# Patient Record
Sex: MALE | Race: WHITE | Employment: OTHER | URBAN - NONMETROPOLITAN AREA
[De-identification: names, ages, dates, MRNs, and addresses within clinical notes are randomized per-mention and may not be internally consistent; named-entity substitution may affect disease eponyms.]

---

## 2017-06-11 RX ORDER — CARBAMAZEPINE 100 MG/1
TABLET, CHEWABLE ORAL
Qty: 300 TABLET | Refills: 0 | Status: SHIPPED | OUTPATIENT
Start: 2017-06-11 | End: 2017-07-06 | Stop reason: SDUPTHER

## 2017-07-06 RX ORDER — CARBAMAZEPINE 100 MG/1
TABLET, CHEWABLE ORAL
Qty: 300 TABLET | Refills: 0 | Status: SHIPPED | OUTPATIENT
Start: 2017-07-06 | End: 2017-08-05 | Stop reason: SDUPTHER

## 2017-07-06 RX ORDER — MELOXICAM 7.5 MG/1
TABLET ORAL
Qty: 60 TABLET | Refills: 0 | Status: SHIPPED | OUTPATIENT
Start: 2017-07-06 | End: 2017-08-05 | Stop reason: SDUPTHER

## 2017-08-07 RX ORDER — RANITIDINE 150 MG/1
TABLET ORAL
Qty: 90 TABLET | Refills: 0 | Status: SHIPPED | OUTPATIENT
Start: 2017-08-07 | End: 2017-09-27 | Stop reason: SDUPTHER

## 2017-08-07 RX ORDER — CARBAMAZEPINE 100 MG/1
TABLET, CHEWABLE ORAL
Qty: 300 TABLET | Refills: 0 | Status: SHIPPED | OUTPATIENT
Start: 2017-08-07 | End: 2017-09-01 | Stop reason: SDUPTHER

## 2017-08-07 RX ORDER — BACLOFEN 20 MG/1
TABLET ORAL
Qty: 90 TABLET | Refills: 0 | Status: SHIPPED | OUTPATIENT
Start: 2017-08-07 | End: 2017-09-01 | Stop reason: SDUPTHER

## 2017-08-07 RX ORDER — MELOXICAM 7.5 MG/1
TABLET ORAL
Qty: 60 TABLET | Refills: 0 | Status: SHIPPED | OUTPATIENT
Start: 2017-08-07 | End: 2017-09-27 | Stop reason: SDUPTHER

## 2017-09-01 RX ORDER — BACLOFEN 20 MG/1
TABLET ORAL
Qty: 90 TABLET | Refills: 0 | Status: SHIPPED | OUTPATIENT
Start: 2017-09-01 | End: 2017-09-27 | Stop reason: SDUPTHER

## 2017-09-01 RX ORDER — NORTRIPTYLINE HYDROCHLORIDE 25 MG/1
CAPSULE ORAL
Qty: 30 CAPSULE | Refills: 0 | Status: SHIPPED | OUTPATIENT
Start: 2017-09-01 | End: 2017-09-27 | Stop reason: SDUPTHER

## 2017-09-01 RX ORDER — CARBAMAZEPINE 100 MG/1
TABLET, CHEWABLE ORAL
Qty: 300 TABLET | Refills: 0 | Status: SHIPPED | OUTPATIENT
Start: 2017-09-01 | End: 2017-09-27 | Stop reason: SDUPTHER

## 2017-09-19 DIAGNOSIS — E78.00 HYPERCHOLESTEREMIA: ICD-10-CM

## 2017-09-20 DIAGNOSIS — E78.00 HYPERCHOLESTEREMIA: ICD-10-CM

## 2017-09-20 LAB
ALBUMIN SERPL-MCNC: 4.2 G/DL (ref 3.5–5.2)
ALP BLD-CCNC: 48 U/L (ref 40–130)
ALT SERPL-CCNC: 21 U/L (ref 5–41)
ANION GAP SERPL CALCULATED.3IONS-SCNC: 15 MMOL/L (ref 7–19)
AST SERPL-CCNC: 20 U/L (ref 5–40)
BILIRUB SERPL-MCNC: 0.3 MG/DL (ref 0.2–1.2)
BUN BLDV-MCNC: 24 MG/DL (ref 8–23)
CALCIUM SERPL-MCNC: 9.4 MG/DL (ref 8.8–10.2)
CHLORIDE BLD-SCNC: 98 MMOL/L (ref 98–111)
CO2: 26 MMOL/L (ref 22–29)
CREAT SERPL-MCNC: 0.6 MG/DL (ref 0.5–1.2)
GFR NON-AFRICAN AMERICAN: >60
GLUCOSE BLD-MCNC: 87 MG/DL (ref 74–109)
HCT VFR BLD CALC: 40.8 % (ref 42–52)
HEMOGLOBIN: 13.8 G/DL (ref 14–18)
LDL CHOLESTEROL DIRECT: 115 MG/DL
MCH RBC QN AUTO: 33.1 PG (ref 27–31)
MCHC RBC AUTO-ENTMCNC: 33.8 G/DL (ref 33–37)
MCV RBC AUTO: 97.8 FL (ref 80–94)
PDW BLD-RTO: 11.8 % (ref 11.5–14.5)
PLATELET # BLD: 264 K/UL (ref 130–400)
PMV BLD AUTO: 9.9 FL (ref 9.4–12.4)
POTASSIUM SERPL-SCNC: 4.1 MMOL/L (ref 3.5–5)
RBC # BLD: 4.17 M/UL (ref 4.7–6.1)
SODIUM BLD-SCNC: 139 MMOL/L (ref 136–145)
TOTAL PROTEIN: 7.3 G/DL (ref 6.6–8.7)
WBC # BLD: 3.8 K/UL (ref 4.8–10.8)

## 2017-09-21 RX ORDER — TAMSULOSIN HYDROCHLORIDE 0.4 MG/1
0.4 CAPSULE ORAL DAILY
COMMUNITY
End: 2018-06-26 | Stop reason: SDUPTHER

## 2017-09-21 RX ORDER — ACETAMINOPHEN, ASPIRIN AND CAFFEINE 250; 250; 65 MG/1; MG/1; MG/1
2 TABLET, FILM COATED ORAL EVERY 6 HOURS PRN
COMMUNITY
End: 2017-11-17

## 2017-09-21 RX ORDER — FINASTERIDE 5 MG/1
5 TABLET, FILM COATED ORAL DAILY
COMMUNITY

## 2017-09-21 RX ORDER — ATORVASTATIN CALCIUM 40 MG/1
40 TABLET, FILM COATED ORAL DAILY
COMMUNITY
End: 2017-09-27 | Stop reason: SDUPTHER

## 2017-09-27 ENCOUNTER — TELEPHONE (OUTPATIENT)
Dept: NEUROSURGERY | Age: 67
End: 2017-09-27

## 2017-09-27 ENCOUNTER — OFFICE VISIT (OUTPATIENT)
Dept: INTERNAL MEDICINE | Age: 67
End: 2017-09-27
Payer: MEDICARE

## 2017-09-27 VITALS
WEIGHT: 159 LBS | SYSTOLIC BLOOD PRESSURE: 124 MMHG | DIASTOLIC BLOOD PRESSURE: 70 MMHG | OXYGEN SATURATION: 97 % | HEART RATE: 68 BPM | HEIGHT: 71 IN | BODY MASS INDEX: 22.26 KG/M2

## 2017-09-27 DIAGNOSIS — R41.3 MEMORY DEFICIT: Primary | ICD-10-CM

## 2017-09-27 DIAGNOSIS — I61.9 NONTRAUMATIC INTRACEREBRAL HEMORRHAGE, UNSPECIFIED CEREBRAL LOCATION, UNSPECIFIED LATERALITY (HCC): ICD-10-CM

## 2017-09-27 DIAGNOSIS — E78.00 PURE HYPERCHOLESTEROLEMIA: ICD-10-CM

## 2017-09-27 DIAGNOSIS — Z23 NEED FOR PROPHYLACTIC VACCINATION AGAINST STREPTOCOCCUS PNEUMONIAE (PNEUMOCOCCUS): ICD-10-CM

## 2017-09-27 DIAGNOSIS — N40.1 BENIGN NON-NODULAR PROSTATIC HYPERPLASIA WITH LOWER URINARY TRACT SYMPTOMS: ICD-10-CM

## 2017-09-27 DIAGNOSIS — Z91.81 RISK FOR FALLS: ICD-10-CM

## 2017-09-27 PROCEDURE — 90670 PCV13 VACCINE IM: CPT | Performed by: INTERNAL MEDICINE

## 2017-09-27 PROCEDURE — 1036F TOBACCO NON-USER: CPT | Performed by: INTERNAL MEDICINE

## 2017-09-27 PROCEDURE — 99214 OFFICE O/P EST MOD 30 MIN: CPT | Performed by: INTERNAL MEDICINE

## 2017-09-27 PROCEDURE — 3017F COLORECTAL CA SCREEN DOC REV: CPT | Performed by: INTERNAL MEDICINE

## 2017-09-27 PROCEDURE — 1123F ACP DISCUSS/DSCN MKR DOCD: CPT | Performed by: INTERNAL MEDICINE

## 2017-09-27 PROCEDURE — G0009 ADMIN PNEUMOCOCCAL VACCINE: HCPCS | Performed by: INTERNAL MEDICINE

## 2017-09-27 PROCEDURE — 4040F PNEUMOC VAC/ADMIN/RCVD: CPT | Performed by: INTERNAL MEDICINE

## 2017-09-27 PROCEDURE — G8420 CALC BMI NORM PARAMETERS: HCPCS | Performed by: INTERNAL MEDICINE

## 2017-09-27 PROCEDURE — G8427 DOCREV CUR MEDS BY ELIG CLIN: HCPCS | Performed by: INTERNAL MEDICINE

## 2017-09-27 RX ORDER — NORTRIPTYLINE HYDROCHLORIDE 25 MG/1
CAPSULE ORAL
Qty: 90 CAPSULE | Refills: 3 | Status: SHIPPED | OUTPATIENT
Start: 2017-09-27 | End: 2018-07-06 | Stop reason: SDUPTHER

## 2017-09-27 RX ORDER — MELOXICAM 7.5 MG/1
TABLET ORAL
Qty: 180 TABLET | Refills: 3 | Status: SHIPPED | OUTPATIENT
Start: 2017-09-27 | End: 2018-07-06 | Stop reason: SDUPTHER

## 2017-09-27 RX ORDER — CARBAMAZEPINE 100 MG/1
TABLET, CHEWABLE ORAL
Qty: 300 TABLET | Refills: 0 | Status: SHIPPED | OUTPATIENT
Start: 2017-09-27 | End: 2017-12-29 | Stop reason: SDUPTHER

## 2017-09-27 RX ORDER — BACLOFEN 20 MG/1
10 TABLET ORAL 3 TIMES DAILY PRN
Qty: 90 TABLET | Refills: 3 | Status: SHIPPED | OUTPATIENT
Start: 2017-09-27 | End: 2018-01-25 | Stop reason: SDUPTHER

## 2017-09-27 RX ORDER — RANITIDINE 150 MG/1
TABLET ORAL
Qty: 90 TABLET | Refills: 3 | Status: SHIPPED | OUTPATIENT
Start: 2017-09-27 | End: 2018-09-18 | Stop reason: SDUPTHER

## 2017-09-27 RX ORDER — ATORVASTATIN CALCIUM 80 MG/1
80 TABLET, FILM COATED ORAL DAILY
Qty: 30 TABLET | Refills: 5 | Status: SHIPPED | OUTPATIENT
Start: 2017-09-27 | End: 2018-07-06 | Stop reason: SDUPTHER

## 2017-09-27 ASSESSMENT — ENCOUNTER SYMPTOMS
ABDOMINAL PAIN: 0
COUGH: 0
SHORTNESS OF BREATH: 0
RHINORRHEA: 0
TROUBLE SWALLOWING: 0
SORE THROAT: 0
NAUSEA: 0

## 2017-09-27 ASSESSMENT — PATIENT HEALTH QUESTIONNAIRE - PHQ9
1. LITTLE INTEREST OR PLEASURE IN DOING THINGS: 0
SUM OF ALL RESPONSES TO PHQ9 QUESTIONS 1 & 2: 0
2. FEELING DOWN, DEPRESSED OR HOPELESS: 0
SUM OF ALL RESPONSES TO PHQ QUESTIONS 1-9: 0

## 2017-11-17 ENCOUNTER — OFFICE VISIT (OUTPATIENT)
Dept: NEUROLOGY | Age: 67
End: 2017-11-17
Payer: MEDICARE

## 2017-11-17 VITALS
DIASTOLIC BLOOD PRESSURE: 58 MMHG | SYSTOLIC BLOOD PRESSURE: 110 MMHG | WEIGHT: 159 LBS | HEIGHT: 71 IN | BODY MASS INDEX: 22.26 KG/M2

## 2017-11-17 DIAGNOSIS — G62.9 NEUROPATHY: ICD-10-CM

## 2017-11-17 DIAGNOSIS — M79.605 PAIN IN BOTH LOWER EXTREMITIES: Primary | ICD-10-CM

## 2017-11-17 DIAGNOSIS — Z87.828 HISTORY OF SPINAL CORD INJURY: ICD-10-CM

## 2017-11-17 DIAGNOSIS — R73.9 HYPERGLYCEMIA: ICD-10-CM

## 2017-11-17 DIAGNOSIS — R41.3 MEMORY LOSS: ICD-10-CM

## 2017-11-17 DIAGNOSIS — Z86.79 HISTORY OF SPONTANEOUS INTRAPARENCHYMAL INTRACRANIAL HEMORRHAGE DUE TO CEREBRAL AVM: ICD-10-CM

## 2017-11-17 DIAGNOSIS — M79.604 PAIN IN BOTH LOWER EXTREMITIES: Primary | ICD-10-CM

## 2017-11-17 LAB
C-REACTIVE PROTEIN: <0.03 MG/DL (ref 0–0.5)
HBA1C MFR BLD: 5.1 %
SEDIMENTATION RATE, ERYTHROCYTE: 5 MM/HR (ref 0–15)
VITAMIN B-12: 752 PG/ML (ref 211–946)

## 2017-11-17 PROCEDURE — 1123F ACP DISCUSS/DSCN MKR DOCD: CPT | Performed by: PSYCHIATRY & NEUROLOGY

## 2017-11-17 PROCEDURE — 3017F COLORECTAL CA SCREEN DOC REV: CPT | Performed by: PSYCHIATRY & NEUROLOGY

## 2017-11-17 PROCEDURE — 99204 OFFICE O/P NEW MOD 45 MIN: CPT | Performed by: PSYCHIATRY & NEUROLOGY

## 2017-11-17 PROCEDURE — 4040F PNEUMOC VAC/ADMIN/RCVD: CPT | Performed by: PSYCHIATRY & NEUROLOGY

## 2017-11-17 PROCEDURE — G8427 DOCREV CUR MEDS BY ELIG CLIN: HCPCS | Performed by: PSYCHIATRY & NEUROLOGY

## 2017-11-17 PROCEDURE — G8420 CALC BMI NORM PARAMETERS: HCPCS | Performed by: PSYCHIATRY & NEUROLOGY

## 2017-11-17 PROCEDURE — 1036F TOBACCO NON-USER: CPT | Performed by: PSYCHIATRY & NEUROLOGY

## 2017-11-17 PROCEDURE — G8484 FLU IMMUNIZE NO ADMIN: HCPCS | Performed by: PSYCHIATRY & NEUROLOGY

## 2017-11-17 NOTE — PROGRESS NOTES
MG capsule TAKE 1 CAPSULE BY MOUTH AT BEDTIME. 90 capsule 3    ranitidine (ZANTAC) 150 MG tablet TAKE 1 TABLET BY MOUTH AT BEDTIME 90 tablet 3    carBAMazepine (TEGRETOL) 100 MG chewable tablet TAKE 2 TABLETS BY MOUTH IN THE MORNING, 3 AT NOON, 2 AT SUPPER AND 3 AT BEDTIME. 300 tablet 0    meloxicam (MOBIC) 7.5 MG tablet TAKE 1 TABLET BY MOUTH TWICE DAILY AS NEEDED 180 tablet 3    finasteride (PROSCAR) 5 MG tablet Take 5 mg by mouth daily      tamsulosin (FLOMAX) 0.4 MG capsule Take 0.4 mg by mouth daily       No current facility-administered medications for this visit. Outpatient Prescriptions Marked as Taking for the 11/17/17 encounter (Office Visit) with Sameera Cota MD   Medication Sig Dispense Refill    Multiple Vitamins-Minerals (ALIVE MENS ENERGY PO) Take by mouth      Aspirin-Acetaminophen-Caffeine (EXCEDRIN EXTRA STRENGTH PO) Take by mouth      atorvastatin (LIPITOR) 80 MG tablet Take 1 tablet by mouth daily 30 tablet 5    baclofen (LIORESAL) 20 MG tablet Take 0.5 tablets by mouth 3 times daily as needed (muscle spasms) 90 tablet 3    nortriptyline (PAMELOR) 25 MG capsule TAKE 1 CAPSULE BY MOUTH AT BEDTIME. 90 capsule 3    ranitidine (ZANTAC) 150 MG tablet TAKE 1 TABLET BY MOUTH AT BEDTIME 90 tablet 3    carBAMazepine (TEGRETOL) 100 MG chewable tablet TAKE 2 TABLETS BY MOUTH IN THE MORNING, 3 AT NOON, 2 AT SUPPER AND 3 AT BEDTIME. 300 tablet 0    meloxicam (MOBIC) 7.5 MG tablet TAKE 1 TABLET BY MOUTH TWICE DAILY AS NEEDED 180 tablet 3    finasteride (PROSCAR) 5 MG tablet Take 5 mg by mouth daily      tamsulosin (FLOMAX) 0.4 MG capsule Take 0.4 mg by mouth daily         BP (!) 110/58   Ht 5' 11\" (1.803 m)   Wt 159 lb (72.1 kg)   BMI 22.18 kg/m²       Constitutional - well developed, well nourished.     Eyes - conjunctiva normal.  Pupils react to light  Ear, nose, throat -hearing intact to finger rub No scars, masses, or lesions over external nose or ears, no atrophy of tongue  Neck-symmetric, no masses noted, no jugular vein distension. No bruits noted. Respiration- chest wall appears symmetric, good expansion,   normal effort without use of accessory muscles  Cardiovascular- RRR  Musculoskeletal - no significant wasting of muscles noted, no bony deformities, gait no gross ataxia  Extremities-no clubbing, cyanosis or edema  Skin - warm, dry, and intact. No rash, erythema, or pallor. Psychiatric - mood, affect, and behavior appear normal.      Neurological exam  Awake, alert, fluent oriented x 3 appropriate affect  Attention and concentration appear appropriate  Remote memory is fair for family information. He cannot tell me the president before Obama. Short term memory 0/4 at 5 minutes. Speech normal without dysarthria  No clear issues with language of fund of knowledge    Cranial Nerve Exam   CN II- Visual fields grossly unremarkable. VA adequate. Discs sharp  CN III, IV,VI- PERRLA, EOMI, No nystagmus, conjugate eye movements, no ptosis  CN V-sensation intact to LT over face  CN VII-no facial asymmetry  CN VIII-Hearing intact   CN IX and X- Palate elevates in midline  CN XI-good shoulder shrug  CN XII-Tongue midline with no fasciculations or fibrillations    Motor Exam  Normal on the right. He has a left hemiparesis with spasticity. Sensory Exam  Decreased vibration above the knee on the right. Decreased vibration to the ankle on the left. Decreased pinprick to the bilateral knees    Reflexes   3+ in the upper extremities and knees. Trace at the ankles  No clonus ankles  No Alvarado's sign bilateral hands. No Babinski sign.     Tremors- no tremors in hands or head noted    Gait  Patient is in a wheelchair    Coordination  Finger to nose and MANUEL-unremarkable    Lab Results   Component Value Date    [id number] 752 11/17/2017     Lab Results   Component Value Date    WBC 3.8 (L) 09/20/2017    HGB 13.8 (L) 09/20/2017    HCT 40.8 (L) 09/20/2017    MCV 97.8 (H) 09/20/2017    PLT

## 2017-11-20 ENCOUNTER — TELEPHONE (OUTPATIENT)
Dept: NEUROSURGERY | Age: 67
End: 2017-11-20

## 2017-11-20 LAB
ALBUMIN SERPL-MCNC: 4.17 G/DL (ref 3.75–5.01)
ALPHA-1-GLOBULIN: 0.35 G/DL (ref 0.19–0.46)
ALPHA-2-GLOBULIN: 0.88 G/DL (ref 0.48–1.05)
BETA GLOBULIN: 0.68 G/DL (ref 0.48–1.1)
GAMMA GLOBULIN: 1.12 G/DL (ref 0.62–1.51)
IMMUNOFIXATION REFLEX: NORMAL
SPE/IFE INTERPRETATION: NORMAL
TOTAL PROTEIN: 7.2 G/DL (ref 6–8.3)

## 2017-11-30 RX ORDER — CARBAMAZEPINE 100 MG/1
TABLET, CHEWABLE ORAL
Qty: 300 TABLET | Refills: 0 | Status: SHIPPED | OUTPATIENT
Start: 2017-11-30 | End: 2017-12-28 | Stop reason: SDUPTHER

## 2017-12-05 ENCOUNTER — HOSPITAL ENCOUNTER (OUTPATIENT)
Dept: NEUROLOGY | Age: 67
Discharge: HOME | End: 2017-12-05
Payer: MEDICARE

## 2017-12-05 ENCOUNTER — HOSPITAL ENCOUNTER (OUTPATIENT)
Dept: MRI IMAGING | Age: 67
Discharge: HOME | End: 2017-12-05
Payer: MEDICARE

## 2017-12-05 DIAGNOSIS — G62.9 NEUROPATHY: ICD-10-CM

## 2017-12-05 DIAGNOSIS — Z87.828 HISTORY OF SPINAL CORD INJURY: ICD-10-CM

## 2017-12-05 LAB
GFR NON-AFRICAN AMERICAN: >60
PERFORMED ON: NORMAL
POC CREATININE: 0.7 MG/DL (ref 0.3–1.3)
POC SAMPLE TYPE: NORMAL

## 2017-12-05 PROCEDURE — 95886 MUSC TEST DONE W/N TEST COMP: CPT

## 2017-12-05 PROCEDURE — 95909 NRV CNDJ TST 5-6 STUDIES: CPT | Performed by: PSYCHIATRY & NEUROLOGY

## 2017-12-05 PROCEDURE — 72156 MRI NECK SPINE W/O & W/DYE: CPT

## 2017-12-05 PROCEDURE — 95909 NRV CNDJ TST 5-6 STUDIES: CPT

## 2017-12-05 PROCEDURE — 82565 ASSAY OF CREATININE: CPT

## 2017-12-05 PROCEDURE — A9577 INJ MULTIHANCE: HCPCS | Performed by: PSYCHIATRY & NEUROLOGY

## 2017-12-05 PROCEDURE — 95886 MUSC TEST DONE W/N TEST COMP: CPT | Performed by: PSYCHIATRY & NEUROLOGY

## 2017-12-05 PROCEDURE — 6360000004 HC RX CONTRAST MEDICATION: Performed by: PSYCHIATRY & NEUROLOGY

## 2017-12-05 RX ADMIN — GADOBENATE DIMEGLUMINE 15 ML: 529 INJECTION, SOLUTION INTRAVENOUS at 10:49

## 2017-12-05 NOTE — PROCEDURES
[email or web address] [facility]                   [address], [address] [facility]                               ELECTROMYOGRAM REPORT    PATIENT NAME: Melodie Andres                        :        1950  MED REC NO:   [id number]                              ROOM:  ACCOUNT NO:   [de-identified]                           ADMIT DATE: 2017  PROVIDER:     Larissa Hopper MD          DATE OF EM2017    EMG/NERVE CONDUCTION STUDY BILATERAL LOWER EXTREMITIES    SUMMARY:  Nerve conduction studies of the bilateral lower extremities show  absent sural responses. All four motor studies are low in amplitude with  prolonged latencies. Conduction velocities are all slowed and F waves are  prolonged. Needle exam of the bilateral lower extremities showed 2+ fibs and positive  waves in the right gastrocnemius with reduced recruitment. The patient was  poorly relaxed for testing of the iliopsoas and tensor fascia latae  bilaterally. IMPRESSION:  1. Abnormal nerve conduction studies consistent with a moderately severe  generalized acquired sensorimotor polyneuropathy. 2.  Abnormal needle exam suggestive of a right S1 radiculopathy. Correlate  clinically.         Mahnaz Hooks MD    D: 2017 13:36:36       T: 2017 13:47:55     [id number]  Job#: [id number]     Doc#: [id number]    CC:

## 2017-12-18 ENCOUNTER — TELEPHONE (OUTPATIENT)
Dept: NEUROSURGERY | Age: 67
End: 2017-12-18

## 2017-12-18 DIAGNOSIS — G95.9 MYELOPATHY (HCC): Primary | ICD-10-CM

## 2017-12-28 ENCOUNTER — HOSPITAL ENCOUNTER (OUTPATIENT)
Dept: MRI IMAGING | Age: 67
Discharge: HOME | End: 2017-12-28
Payer: MEDICARE

## 2017-12-28 DIAGNOSIS — G95.9 MYELOPATHY (HCC): ICD-10-CM

## 2017-12-28 PROCEDURE — 72146 MRI CHEST SPINE W/O DYE: CPT

## 2017-12-28 PROCEDURE — 72148 MRI LUMBAR SPINE W/O DYE: CPT

## 2017-12-29 RX ORDER — CARBAMAZEPINE 100 MG/1
TABLET, CHEWABLE ORAL
Qty: 300 TABLET | Refills: 0 | Status: SHIPPED | OUTPATIENT
Start: 2017-12-29 | End: 2018-01-25 | Stop reason: SDUPTHER

## 2018-01-18 ENCOUNTER — OFFICE VISIT (OUTPATIENT)
Dept: NEUROLOGY | Age: 68
End: 2018-01-18
Payer: MEDICARE

## 2018-01-18 VITALS
DIASTOLIC BLOOD PRESSURE: 65 MMHG | HEIGHT: 71 IN | WEIGHT: 158 LBS | BODY MASS INDEX: 22.12 KG/M2 | SYSTOLIC BLOOD PRESSURE: 114 MMHG

## 2018-01-18 DIAGNOSIS — G95.9 MYELOPATHY (HCC): Primary | ICD-10-CM

## 2018-01-18 DIAGNOSIS — G62.9 NEUROPATHY: ICD-10-CM

## 2018-01-18 DIAGNOSIS — M54.17 LUMBOSACRAL RADICULOPATHY AT S1: ICD-10-CM

## 2018-01-18 PROCEDURE — G8427 DOCREV CUR MEDS BY ELIG CLIN: HCPCS | Performed by: PSYCHIATRY & NEUROLOGY

## 2018-01-18 PROCEDURE — 3017F COLORECTAL CA SCREEN DOC REV: CPT | Performed by: PSYCHIATRY & NEUROLOGY

## 2018-01-18 PROCEDURE — 4040F PNEUMOC VAC/ADMIN/RCVD: CPT | Performed by: PSYCHIATRY & NEUROLOGY

## 2018-01-18 PROCEDURE — G8420 CALC BMI NORM PARAMETERS: HCPCS | Performed by: PSYCHIATRY & NEUROLOGY

## 2018-01-18 PROCEDURE — 1123F ACP DISCUSS/DSCN MKR DOCD: CPT | Performed by: PSYCHIATRY & NEUROLOGY

## 2018-01-18 PROCEDURE — 1036F TOBACCO NON-USER: CPT | Performed by: PSYCHIATRY & NEUROLOGY

## 2018-01-18 PROCEDURE — 99214 OFFICE O/P EST MOD 30 MIN: CPT | Performed by: PSYCHIATRY & NEUROLOGY

## 2018-01-18 PROCEDURE — G8484 FLU IMMUNIZE NO ADMIN: HCPCS | Performed by: PSYCHIATRY & NEUROLOGY

## 2018-01-18 RX ORDER — LATANOPROST 50 UG/ML
SOLUTION/ DROPS OPHTHALMIC
COMMUNITY
Start: 2018-01-04

## 2018-01-19 NOTE — PROGRESS NOTES
deficit     Neuropathy ([facility] [address]      Resolved Ambulatory Problems     Diagnosis Date Noted    No Resolved Ambulatory Problems     Past Medical History:   Diagnosis Date    Abnormal urinary stream     Benign enlargement of prostate     Cerebral parenchymal hemorrhage (HCC)     Chronic pain syndrome     Esophageal reflux     Left hemiplegia (HCC)     Memory deficit     Neuropathy (HCC)     Osteopenia     Pure hypercholesterolemia     Risk for falls        Past Surgical History:   Procedure Laterality Date    APPENDECTOMY         Family History   Problem Relation Age of Onset    Coronary Art Dis Father     Diabetes Other     High Blood Pressure Other     High Cholesterol Other        Allergies   Allergen Reactions    Dilantin [Phenytoin Sodium Extended]        Social History     Social History    Marital status:      Spouse name: N/A    Number of children: N/A    Years of education: N/A     Occupational History    Not on file.      Social History Main Topics    Smoking status: Former Smoker    Smokeless tobacco: Never Used    Alcohol use No    Drug use: No    Sexual activity: Not on file     Other Topics Concern    Not on file     Social History Narrative    No narrative on file       Review of Systems    Constitutional: []Fever []Sweats []Chills [] Recent Injury   [x] Denies all unless marked  HENT:[]Headache  [] Head Injury  [] Sore Throat  [] Ear Pain  [] Dizziness [] Hearing Loss   [x] Denies all unless marked  Spine:  [] Neck pain  [] Back pain  [] Sciaticia  [x] Denies all unless marked  Cardiovascular:[]Chest Pain []Palpitations [] Heart Disease  [x] Denies all unless marked  Pulmonary: []Shortness of Breath []Cough   [x] Denies all unless marked  Gastrointestinal:  []Abdominal Pain  []Blood in Stool  []Diarrhea []Constipation []Nausea  []Vomiting  [x] Denies all unless marked  Genitourinary:  [] Dysuria [] Frequency  [] Incontinence [] Urgency   [x] Denies all unless marked  Musculoskeletal: [] Arthralgia  [] Myalgias [] Muscle cramps  [] Muscle twitches   [x] Denies all unless marked   Extremities:   [] Pain   [] Swelling   [x] Denies all unless marked  Skin:[] Rash  [] Color Change  [x] Denies all unless marked  Neurological:[] Visual Disturbance [] Double Vision [] Slurred Speech [] Trouble swallowing  [] Vertigo [] Tingling [] Numbness [] Weakness [] Loss of Balance   [] Loss of Consciousness [] Memory Loss  [x] Denies all unless marked  Psychiatric/Behavioral:[] Depression [] Anxiety  [x] Denies all unless marked  Sleep: []  Insomnia [] Sleep Disturbance [] Snoring [] Restless Legs [] Daytime Sleepiness [] Sleep Apnea  [x] Denies all unless marked                  Current Outpatient Prescriptions   Medication Sig Dispense Refill    latanoprost (XALATAN) 0.005 % ophthalmic solution       carBAMazepine (TEGRETOL) 100 MG chewable tablet TAKE 2 TABLETS BY MOUTH IN THE MORNING, 3 AT NOON, 2 AT SUPPER, AND 3AT BEDTIME. 300 tablet 0    Multiple Vitamins-Minerals (ALIVE MENS ENERGY PO) Take by mouth      Aspirin-Acetaminophen-Caffeine (EXCEDRIN EXTRA STRENGTH PO) Take by mouth      atorvastatin (LIPITOR) 80 MG tablet Take 1 tablet by mouth daily 30 tablet 5    baclofen (LIORESAL) 20 MG tablet Take 0.5 tablets by mouth 3 times daily as needed (muscle spasms) 90 tablet 3    nortriptyline (PAMELOR) 25 MG capsule TAKE 1 CAPSULE BY MOUTH AT BEDTIME. 90 capsule 3    ranitidine (ZANTAC) 150 MG tablet TAKE 1 TABLET BY MOUTH AT BEDTIME 90 tablet 3    meloxicam (MOBIC) 7.5 MG tablet TAKE 1 TABLET BY MOUTH TWICE DAILY AS NEEDED 180 tablet 3    finasteride (PROSCAR) 5 MG tablet Take 5 mg by mouth daily      tamsulosin (FLOMAX) 0.4 MG capsule Take 0.4 mg by mouth daily       No current facility-administered medications for this visit.         Outpatient Prescriptions Marked as Taking for the 1/18/18 encounter (Office Visit) with Yaneth Blanco MD   Medication Sig Dispense Refill   

## 2018-03-05 DIAGNOSIS — E78.00 PURE HYPERCHOLESTEROLEMIA: ICD-10-CM

## 2018-03-05 DIAGNOSIS — N40.1 BENIGN NON-NODULAR PROSTATIC HYPERPLASIA WITH LOWER URINARY TRACT SYMPTOMS: ICD-10-CM

## 2018-03-05 DIAGNOSIS — Z91.81 RISK FOR FALLS: ICD-10-CM

## 2018-03-05 DIAGNOSIS — R41.3 MEMORY DEFICIT: ICD-10-CM

## 2018-03-05 DIAGNOSIS — I61.9 NONTRAUMATIC INTRACEREBRAL HEMORRHAGE, UNSPECIFIED CEREBRAL LOCATION, UNSPECIFIED LATERALITY (HCC): ICD-10-CM

## 2018-03-05 LAB
ALBUMIN SERPL-MCNC: 4.1 G/DL (ref 3.5–5.2)
ALP BLD-CCNC: 56 U/L (ref 40–130)
ALT SERPL-CCNC: 23 U/L (ref 5–41)
ANION GAP SERPL CALCULATED.3IONS-SCNC: 10 MMOL/L (ref 7–19)
AST SERPL-CCNC: 22 U/L (ref 5–40)
BILIRUB SERPL-MCNC: 0.4 MG/DL (ref 0.2–1.2)
BUN BLDV-MCNC: 15 MG/DL (ref 8–23)
CALCIUM SERPL-MCNC: 9.1 MG/DL (ref 8.8–10.2)
CHLORIDE BLD-SCNC: 99 MMOL/L (ref 98–111)
CHOLESTEROL, TOTAL: 195 MG/DL (ref 160–199)
CO2: 29 MMOL/L (ref 22–29)
CREAT SERPL-MCNC: 0.5 MG/DL (ref 0.5–1.2)
GFR NON-AFRICAN AMERICAN: >60
GLUCOSE BLD-MCNC: 96 MG/DL (ref 74–109)
HCT VFR BLD CALC: 40.9 % (ref 42–52)
HDLC SERPL-MCNC: 99 MG/DL (ref 55–121)
HEMOGLOBIN: 14 G/DL (ref 14–18)
LDL CHOLESTEROL CALCULATED: 82 MG/DL
MCH RBC QN AUTO: 33.1 PG (ref 27–31)
MCHC RBC AUTO-ENTMCNC: 34.2 G/DL (ref 33–37)
MCV RBC AUTO: 96.7 FL (ref 80–94)
PDW BLD-RTO: 11.9 % (ref 11.5–14.5)
PLATELET # BLD: 268 K/UL (ref 130–400)
PMV BLD AUTO: 9.4 FL (ref 9.4–12.4)
POTASSIUM SERPL-SCNC: 3.9 MMOL/L (ref 3.5–5)
RBC # BLD: 4.23 M/UL (ref 4.7–6.1)
SODIUM BLD-SCNC: 138 MMOL/L (ref 136–145)
TOTAL PROTEIN: 7.1 G/DL (ref 6.6–8.7)
TRIGL SERPL-MCNC: 70 MG/DL (ref 0–149)
WBC # BLD: 4.6 K/UL (ref 4.8–10.8)

## 2018-03-06 DIAGNOSIS — R41.3 MEMORY DEFICIT: ICD-10-CM

## 2018-03-06 DIAGNOSIS — I61.9 NONTRAUMATIC INTRACEREBRAL HEMORRHAGE, UNSPECIFIED CEREBRAL LOCATION, UNSPECIFIED LATERALITY (HCC): ICD-10-CM

## 2018-03-06 DIAGNOSIS — Z91.81 RISK FOR FALLS: ICD-10-CM

## 2018-03-06 DIAGNOSIS — E78.00 PURE HYPERCHOLESTEROLEMIA: ICD-10-CM

## 2018-03-06 DIAGNOSIS — N40.1 BENIGN NON-NODULAR PROSTATIC HYPERPLASIA WITH LOWER URINARY TRACT SYMPTOMS: ICD-10-CM

## 2018-03-06 RX ORDER — BACLOFEN 20 MG/1
20 TABLET ORAL 3 TIMES DAILY
Qty: 90 TABLET | Refills: 1 | Status: SHIPPED | OUTPATIENT
Start: 2018-03-06 | End: 2018-04-27 | Stop reason: SDUPTHER

## 2018-03-12 ENCOUNTER — OFFICE VISIT (OUTPATIENT)
Dept: INTERNAL MEDICINE | Age: 68
End: 2018-03-12
Payer: MEDICARE

## 2018-03-12 VITALS
DIASTOLIC BLOOD PRESSURE: 72 MMHG | HEIGHT: 71 IN | OXYGEN SATURATION: 97 % | HEART RATE: 74 BPM | BODY MASS INDEX: 21.14 KG/M2 | SYSTOLIC BLOOD PRESSURE: 134 MMHG | WEIGHT: 151 LBS

## 2018-03-12 DIAGNOSIS — I61.9 NONTRAUMATIC INTRACEREBRAL HEMORRHAGE, UNSPECIFIED CEREBRAL LOCATION, UNSPECIFIED LATERALITY (HCC): ICD-10-CM

## 2018-03-12 DIAGNOSIS — R35.0 BENIGN PROSTATIC HYPERPLASIA WITH URINARY FREQUENCY: ICD-10-CM

## 2018-03-12 DIAGNOSIS — Z91.81 RISK FOR FALLS: ICD-10-CM

## 2018-03-12 DIAGNOSIS — E78.00 PURE HYPERCHOLESTEROLEMIA: ICD-10-CM

## 2018-03-12 DIAGNOSIS — N40.1 BENIGN PROSTATIC HYPERPLASIA WITH URINARY FREQUENCY: ICD-10-CM

## 2018-03-12 DIAGNOSIS — E78.00 HYPERCHOLESTEREMIA: Primary | ICD-10-CM

## 2018-03-12 PROCEDURE — G8427 DOCREV CUR MEDS BY ELIG CLIN: HCPCS | Performed by: INTERNAL MEDICINE

## 2018-03-12 PROCEDURE — G8420 CALC BMI NORM PARAMETERS: HCPCS | Performed by: INTERNAL MEDICINE

## 2018-03-12 PROCEDURE — 4040F PNEUMOC VAC/ADMIN/RCVD: CPT | Performed by: INTERNAL MEDICINE

## 2018-03-12 PROCEDURE — 1123F ACP DISCUSS/DSCN MKR DOCD: CPT | Performed by: INTERNAL MEDICINE

## 2018-03-12 PROCEDURE — G8484 FLU IMMUNIZE NO ADMIN: HCPCS | Performed by: INTERNAL MEDICINE

## 2018-03-12 PROCEDURE — 3017F COLORECTAL CA SCREEN DOC REV: CPT | Performed by: INTERNAL MEDICINE

## 2018-03-12 PROCEDURE — 99214 OFFICE O/P EST MOD 30 MIN: CPT | Performed by: INTERNAL MEDICINE

## 2018-03-12 PROCEDURE — 1036F TOBACCO NON-USER: CPT | Performed by: INTERNAL MEDICINE

## 2018-03-12 RX ORDER — MV-MIN/VIT C/GLUT/LYSINE/HB124 250-12.5MG
1 TABLET,CHEWABLE ORAL DAILY
COMMUNITY
End: 2018-06-26

## 2018-03-12 ASSESSMENT — ENCOUNTER SYMPTOMS
SORE THROAT: 0
COUGH: 0
NAUSEA: 0
ABDOMINAL PAIN: 0
SHORTNESS OF BREATH: 0
RHINORRHEA: 0
TROUBLE SWALLOWING: 0

## 2018-03-12 NOTE — PROGRESS NOTES
Normocephalic and atraumatic. Eyes: Pupils are equal, round, and reactive to light. No scleral icterus. Neck: No JVD present. Cardiovascular: Regular rhythm. No murmur heard. Pulmonary/Chest: No respiratory distress. He exhibits no tenderness. Abdominal: He exhibits no mass. There is no tenderness. Musculoskeletal: He exhibits no edema or deformity. Lymphadenopathy:     He has no cervical adenopathy. Neurological: He is alert. No cranial nerve deficit. Memory is poor, left hemiparesis, is able to walk with a walker   Skin: Skin is warm. No erythema.         Orders Only on 03/05/2018   Component Date Value Ref Range Status    WBC 03/05/2018 4.6* 4.8 - 10.8 K/uL Final    RBC 03/05/2018 4.23* 4.70 - 6.10 M/uL Final    Hemoglobin 03/05/2018 14.0  14.0 - 18.0 g/dL Final    Hematocrit 03/05/2018 40.9* 42.0 - 52.0 % Final    MCV 03/05/2018 96.7* 80.0 - 94.0 fL Final    MCH 03/05/2018 33.1* 27.0 - 31.0 pg Final    MCHC 03/05/2018 34.2  33.0 - 37.0 g/dL Final    RDW 03/05/2018 11.9  11.5 - 14.5 % Final    Platelets 24/59/5525 268  130 - 400 K/uL Final    MPV 03/05/2018 9.4  9.4 - 12.4 fL Final    Sodium 03/05/2018 138  136 - 145 mmol/L Final    Potassium 03/05/2018 3.9  3.5 - 5.0 mmol/L Final    Chloride 03/05/2018 99  98 - 111 mmol/L Final    CO2 03/05/2018 29  22 - 29 mmol/L Final    Anion Gap 03/05/2018 10  7 - 19 mmol/L Final    Glucose 03/05/2018 96  74 - 109 mg/dL Final    BUN 03/05/2018 15  8 - 23 mg/dL Final    CREATININE 03/05/2018 0.5  0.5 - 1.2 mg/dL Final    GFR Non- 03/05/2018 >60  >60 Final    Calcium 03/05/2018 9.1  8.8 - 10.2 mg/dL Final    Total Protein 03/05/2018 7.1  6.6 - 8.7 g/dL Final    Alb 03/05/2018 4.1  3.5 - 5.2 g/dL Final    Total Bilirubin 03/05/2018 0.4  0.2 - 1.2 mg/dL Final    Alkaline Phosphatase 03/05/2018 56  40 - 130 U/L Final    ALT 03/05/2018 23  5 - 41 U/L Final    AST 03/05/2018 22  5 - 40 U/L Final    Cholesterol, Total 03/05/2018 195  160 - 199 mg/dL Final    Triglycerides 03/05/2018 70  0 - 149 mg/dL Final    HDL 03/05/2018 99  55 - 121 mg/dL Final    LDL Calculated 03/05/2018 82  <100 mg/dL Final       1. Hypercholesteremia    2. Risk for falls    3. Pure hypercholesterolemia    4. Nontraumatic intracerebral hemorrhage, unspecified cerebral location, unspecified laterality ([facility] [address]    5. Benign prostatic hyperplasia with urinary frequency         ASSESSMENT/PLAN    Been his doing fairly well overall, very well cared for by his wife. She is now working 2.5 days per week, at the [employer] with [employer]    His controlled on statin, LDL 82 triglycerides 70    He does have a marked left hemiplegia, and he does tend to fall. Another round of physical therapy maybe in the future. Seen Dr. Luciana Michel in the past, September, who placed him on Proscar and Flomax. This does not seem to have affected his urinary incontinence very much.  His PSA was low at 0.628    Eye exams have been done by Dr. Silvina Ponce    Return visit 6 months to other see me or Alexis Blanco, with CBC, CMP, LDL, TSH    We will need to inquire if he would like to have noninvasive screening for colon cancer, such as ColoGuard  or yearly Hemoccults        Orders Placed This Encounter   Procedures    CBC     Standing Status:   Future     Standing Expiration Date:   3/12/2019    Comprehensive Metabolic Panel     Standing Status:   Future     Standing Expiration Date:   3/12/2019    LDL Cholesterol, Direct     Standing Status:   Future     Standing Expiration Date:   3/12/2019    TSH with Reflex     Standing Status:   Future     Standing Expiration Date:   3/12/2019

## 2018-03-12 NOTE — PATIENT INSTRUCTIONS
In is fairly stable, may need another round of physical therapy either at home, or at an office, due to his falls.

## 2018-04-16 ENCOUNTER — OFFICE VISIT (OUTPATIENT)
Dept: INTERNAL MEDICINE | Age: 68
End: 2018-04-16
Payer: MEDICARE

## 2018-04-16 ENCOUNTER — HOSPITAL ENCOUNTER (OUTPATIENT)
Dept: GENERAL RADIOLOGY | Age: 68
Discharge: HOME | End: 2018-04-16
Payer: MEDICARE

## 2018-04-16 VITALS
SYSTOLIC BLOOD PRESSURE: 132 MMHG | HEART RATE: 76 BPM | HEIGHT: 71 IN | OXYGEN SATURATION: 95 % | RESPIRATION RATE: 16 BRPM | WEIGHT: 151 LBS | DIASTOLIC BLOOD PRESSURE: 74 MMHG | BODY MASS INDEX: 21.14 KG/M2

## 2018-04-16 DIAGNOSIS — M79.89 SWELLING OF LEFT THUMB: ICD-10-CM

## 2018-04-16 DIAGNOSIS — G81.94 LEFT HEMIPARESIS (HCC): ICD-10-CM

## 2018-04-16 DIAGNOSIS — I61.9 NONTRAUMATIC INTRACEREBRAL HEMORRHAGE, UNSPECIFIED CEREBRAL LOCATION, UNSPECIFIED LATERALITY (HCC): ICD-10-CM

## 2018-04-16 DIAGNOSIS — S69.92XA THUMB INJURY, LEFT, INITIAL ENCOUNTER: Primary | ICD-10-CM

## 2018-04-16 DIAGNOSIS — M79.89 SWELLING OF LEFT THUMB: Primary | ICD-10-CM

## 2018-04-16 PROCEDURE — G8420 CALC BMI NORM PARAMETERS: HCPCS | Performed by: NURSE PRACTITIONER

## 2018-04-16 PROCEDURE — 4040F PNEUMOC VAC/ADMIN/RCVD: CPT | Performed by: NURSE PRACTITIONER

## 2018-04-16 PROCEDURE — 3017F COLORECTAL CA SCREEN DOC REV: CPT | Performed by: NURSE PRACTITIONER

## 2018-04-16 PROCEDURE — G8427 DOCREV CUR MEDS BY ELIG CLIN: HCPCS | Performed by: NURSE PRACTITIONER

## 2018-04-16 PROCEDURE — 1123F ACP DISCUSS/DSCN MKR DOCD: CPT | Performed by: NURSE PRACTITIONER

## 2018-04-16 PROCEDURE — 73130 X-RAY EXAM OF HAND: CPT

## 2018-04-16 PROCEDURE — 1036F TOBACCO NON-USER: CPT | Performed by: NURSE PRACTITIONER

## 2018-04-16 PROCEDURE — 99213 OFFICE O/P EST LOW 20 MIN: CPT | Performed by: NURSE PRACTITIONER

## 2018-04-16 ASSESSMENT — ENCOUNTER SYMPTOMS
STRIDOR: 0
BLOOD IN STOOL: 0
DIARRHEA: 0
SHORTNESS OF BREATH: 0
CONSTIPATION: 0
WHEEZING: 0
EYE ITCHING: 0
COUGH: 0
SORE THROAT: 0
EYE DISCHARGE: 0
NAUSEA: 0
COLOR CHANGE: 0
VOMITING: 0
CHOKING: 0
ABDOMINAL DISTENTION: 0
TROUBLE SWALLOWING: 0
ABDOMINAL PAIN: 0

## 2018-04-27 DIAGNOSIS — E78.00 PURE HYPERCHOLESTEROLEMIA: ICD-10-CM

## 2018-04-27 DIAGNOSIS — R41.3 MEMORY DEFICIT: ICD-10-CM

## 2018-04-27 DIAGNOSIS — I61.9 NONTRAUMATIC INTRACEREBRAL HEMORRHAGE, UNSPECIFIED CEREBRAL LOCATION, UNSPECIFIED LATERALITY (HCC): ICD-10-CM

## 2018-04-27 DIAGNOSIS — Z91.81 RISK FOR FALLS: ICD-10-CM

## 2018-04-27 DIAGNOSIS — N40.1 BENIGN NON-NODULAR PROSTATIC HYPERPLASIA WITH LOWER URINARY TRACT SYMPTOMS: ICD-10-CM

## 2018-04-27 RX ORDER — CARBAMAZEPINE 100 MG/1
TABLET, CHEWABLE ORAL
Qty: 300 TABLET | Refills: 0 | Status: SHIPPED | OUTPATIENT
Start: 2018-04-27 | End: 2018-06-04 | Stop reason: SDUPTHER

## 2018-04-27 RX ORDER — BACLOFEN 20 MG/1
TABLET ORAL
Qty: 90 TABLET | Refills: 0 | Status: SHIPPED | OUTPATIENT
Start: 2018-04-27 | End: 2018-06-04 | Stop reason: SDUPTHER

## 2018-06-04 DIAGNOSIS — E78.00 PURE HYPERCHOLESTEROLEMIA: ICD-10-CM

## 2018-06-04 DIAGNOSIS — N40.1 BENIGN NON-NODULAR PROSTATIC HYPERPLASIA WITH LOWER URINARY TRACT SYMPTOMS: ICD-10-CM

## 2018-06-04 DIAGNOSIS — R41.3 MEMORY DEFICIT: ICD-10-CM

## 2018-06-04 DIAGNOSIS — Z91.81 RISK FOR FALLS: ICD-10-CM

## 2018-06-04 DIAGNOSIS — I61.9 NONTRAUMATIC INTRACEREBRAL HEMORRHAGE, UNSPECIFIED CEREBRAL LOCATION, UNSPECIFIED LATERALITY (HCC): ICD-10-CM

## 2018-06-05 RX ORDER — CARBAMAZEPINE 100 MG/1
TABLET, CHEWABLE ORAL
Qty: 300 TABLET | Refills: 0 | Status: SHIPPED | OUTPATIENT
Start: 2018-06-05 | End: 2018-07-06 | Stop reason: SDUPTHER

## 2018-06-05 RX ORDER — BACLOFEN 20 MG/1
TABLET ORAL
Qty: 90 TABLET | Refills: 0 | Status: SHIPPED | OUTPATIENT
Start: 2018-06-05 | End: 2018-07-06 | Stop reason: SDUPTHER

## 2018-06-26 ENCOUNTER — OFFICE VISIT (OUTPATIENT)
Dept: INTERNAL MEDICINE | Age: 68
End: 2018-06-26
Payer: MEDICARE

## 2018-06-26 VITALS
HEART RATE: 80 BPM | BODY MASS INDEX: 21 KG/M2 | DIASTOLIC BLOOD PRESSURE: 64 MMHG | WEIGHT: 150 LBS | HEIGHT: 71 IN | OXYGEN SATURATION: 97 % | SYSTOLIC BLOOD PRESSURE: 126 MMHG

## 2018-06-26 DIAGNOSIS — R33.9 URINARY RETENTION: ICD-10-CM

## 2018-06-26 DIAGNOSIS — K59.00 CONSTIPATION, UNSPECIFIED CONSTIPATION TYPE: ICD-10-CM

## 2018-06-26 DIAGNOSIS — N40.1 BENIGN PROSTATIC HYPERPLASIA WITH URINARY FREQUENCY: ICD-10-CM

## 2018-06-26 DIAGNOSIS — R35.0 BENIGN PROSTATIC HYPERPLASIA WITH URINARY FREQUENCY: ICD-10-CM

## 2018-06-26 DIAGNOSIS — K62.89 RECTAL PAIN: Primary | ICD-10-CM

## 2018-06-26 LAB
BILIRUBIN URINE: NEGATIVE
BLOOD, URINE: NEGATIVE
CLARITY: CLEAR
COLOR: ABNORMAL
GLUCOSE URINE: NEGATIVE MG/DL
KETONES, URINE: 15 MG/DL
LEUKOCYTE ESTERASE, URINE: NEGATIVE
NITRITE, URINE: NEGATIVE
PH UA: 6
PROTEIN UA: NEGATIVE MG/DL
SPECIFIC GRAVITY UA: 1.02
URINE REFLEX TO CULTURE: ABNORMAL
UROBILINOGEN, URINE: 1 E.U./DL

## 2018-06-26 PROCEDURE — G8427 DOCREV CUR MEDS BY ELIG CLIN: HCPCS | Performed by: NURSE PRACTITIONER

## 2018-06-26 PROCEDURE — 4040F PNEUMOC VAC/ADMIN/RCVD: CPT | Performed by: NURSE PRACTITIONER

## 2018-06-26 PROCEDURE — 1036F TOBACCO NON-USER: CPT | Performed by: NURSE PRACTITIONER

## 2018-06-26 PROCEDURE — 3017F COLORECTAL CA SCREEN DOC REV: CPT | Performed by: NURSE PRACTITIONER

## 2018-06-26 PROCEDURE — G8420 CALC BMI NORM PARAMETERS: HCPCS | Performed by: NURSE PRACTITIONER

## 2018-06-26 PROCEDURE — 1123F ACP DISCUSS/DSCN MKR DOCD: CPT | Performed by: NURSE PRACTITIONER

## 2018-06-26 PROCEDURE — 99214 OFFICE O/P EST MOD 30 MIN: CPT | Performed by: NURSE PRACTITIONER

## 2018-06-26 RX ORDER — TAMSULOSIN HYDROCHLORIDE 0.4 MG/1
0.4 CAPSULE ORAL 2 TIMES DAILY
Qty: 60 CAPSULE | Refills: 3 | Status: SHIPPED | OUTPATIENT
Start: 2018-06-26

## 2018-06-26 RX ORDER — FINASTERIDE 5 MG/1
TABLET, FILM COATED ORAL
COMMUNITY
Start: 2018-05-22 | End: 2018-06-26

## 2018-06-26 RX ORDER — POLYETHYLENE GLYCOL 3350 17 G/17G
17 POWDER, FOR SOLUTION ORAL 2 TIMES DAILY
Qty: 527 G | Refills: 1 | Status: SHIPPED | OUTPATIENT
Start: 2018-06-26 | End: 2018-07-26

## 2018-06-26 RX ORDER — NORTRIPTYLINE HYDROCHLORIDE 25 MG/1
CAPSULE ORAL
COMMUNITY
Start: 2018-05-22 | End: 2018-06-26

## 2018-06-26 RX ORDER — HYDROCORTISONE ACETATE 25 MG/1
25 SUPPOSITORY RECTAL EVERY 12 HOURS
Qty: 9 SUPPOSITORY | Refills: 0 | Status: SHIPPED | OUTPATIENT
Start: 2018-06-26

## 2018-06-26 RX ORDER — LATANOPROST 50 UG/ML
SOLUTION/ DROPS OPHTHALMIC
COMMUNITY
Start: 2018-05-22 | End: 2018-06-26

## 2018-06-26 ASSESSMENT — ENCOUNTER SYMPTOMS
RECTAL PAIN: 1
RHINORRHEA: 0
CONSTIPATION: 1
COUGH: 0
VOICE CHANGE: 0
PHOTOPHOBIA: 0
NAUSEA: 0
COLOR CHANGE: 0
VOMITING: 0
SHORTNESS OF BREATH: 0
BACK PAIN: 0

## 2018-06-29 PROBLEM — K64.9 HEMORRHOIDS: Status: ACTIVE | Noted: 2018-06-29

## 2018-07-06 DIAGNOSIS — Z91.81 RISK FOR FALLS: ICD-10-CM

## 2018-07-06 DIAGNOSIS — R41.3 MEMORY DEFICIT: ICD-10-CM

## 2018-07-06 DIAGNOSIS — N40.1 BENIGN NON-NODULAR PROSTATIC HYPERPLASIA WITH LOWER URINARY TRACT SYMPTOMS: ICD-10-CM

## 2018-07-06 DIAGNOSIS — I61.9 NONTRAUMATIC INTRACEREBRAL HEMORRHAGE, UNSPECIFIED CEREBRAL LOCATION, UNSPECIFIED LATERALITY (HCC): ICD-10-CM

## 2018-07-06 DIAGNOSIS — E78.00 PURE HYPERCHOLESTEROLEMIA: ICD-10-CM

## 2018-07-06 RX ORDER — ATORVASTATIN CALCIUM 80 MG/1
80 TABLET, FILM COATED ORAL DAILY
Qty: 30 TABLET | Refills: 1 | Status: SHIPPED | OUTPATIENT
Start: 2018-07-06 | End: 2018-07-09 | Stop reason: SDUPTHER

## 2018-07-06 RX ORDER — CARBAMAZEPINE 100 MG/1
TABLET, CHEWABLE ORAL
Qty: 300 TABLET | Refills: 0 | Status: SHIPPED | OUTPATIENT
Start: 2018-07-06 | End: 2018-07-09 | Stop reason: SDUPTHER

## 2018-07-06 RX ORDER — MELOXICAM 7.5 MG/1
TABLET ORAL
Qty: 60 TABLET | Refills: 1 | Status: SHIPPED | OUTPATIENT
Start: 2018-07-06 | End: 2018-07-09 | Stop reason: SDUPTHER

## 2018-07-06 RX ORDER — BACLOFEN 20 MG/1
TABLET ORAL
Qty: 90 TABLET | Refills: 1 | Status: SHIPPED | OUTPATIENT
Start: 2018-07-06 | End: 2018-07-09 | Stop reason: SDUPTHER

## 2018-07-06 RX ORDER — NORTRIPTYLINE HYDROCHLORIDE 25 MG/1
CAPSULE ORAL
Qty: 30 CAPSULE | Refills: 1 | Status: SHIPPED | OUTPATIENT
Start: 2018-07-06 | End: 2018-07-09 | Stop reason: SDUPTHER

## 2018-07-06 NOTE — TELEPHONE ENCOUNTER
Patient needs refill on   Requested Prescriptions     Pending Prescriptions Disp Refills    baclofen (LIORESAL) 20 MG tablet 90 tablet 1     Sig: TAKE 1 TABLET BY MOUTH THREE TIMES DAILY.  carBAMazepine (TEGRETOL) 100 MG chewable tablet 300 tablet 0     Sig: TAKE 2 TABLETS BY MOUTH IN THE MORNING, 3 AT NOON, 2 AT SUPPER, AND 3AT BEDTIME.  nortriptyline (PAMELOR) 25 MG capsule 30 capsule 1     Sig: TAKE 1 CAPSULE BY MOUTH AT BEDTIME.     meloxicam (MOBIC) 7.5 MG tablet 60 tablet 1     Sig: TAKE 1 TABLET BY MOUTH TWICE DAILY AS NEEDED    atorvastatin (LIPITOR) 80 MG tablet 30 tablet 1     Sig: Take 1 tablet by mouth daily

## 2018-07-09 ENCOUNTER — TELEPHONE (OUTPATIENT)
Dept: INTERNAL MEDICINE | Age: 68
End: 2018-07-09

## 2018-07-09 DIAGNOSIS — N40.1 BENIGN NON-NODULAR PROSTATIC HYPERPLASIA WITH LOWER URINARY TRACT SYMPTOMS: ICD-10-CM

## 2018-07-09 DIAGNOSIS — I61.9 NONTRAUMATIC INTRACEREBRAL HEMORRHAGE, UNSPECIFIED CEREBRAL LOCATION, UNSPECIFIED LATERALITY (HCC): ICD-10-CM

## 2018-07-09 DIAGNOSIS — E78.00 PURE HYPERCHOLESTEROLEMIA: ICD-10-CM

## 2018-07-09 DIAGNOSIS — Z91.81 RISK FOR FALLS: ICD-10-CM

## 2018-07-09 DIAGNOSIS — R41.3 MEMORY DEFICIT: ICD-10-CM

## 2018-07-10 RX ORDER — CARBAMAZEPINE 100 MG/1
TABLET, CHEWABLE ORAL
Qty: 300 TABLET | Refills: 11 | Status: SHIPPED | OUTPATIENT
Start: 2018-07-10 | End: 2018-07-12 | Stop reason: SDUPTHER

## 2018-07-10 RX ORDER — ATORVASTATIN CALCIUM 80 MG/1
80 TABLET, FILM COATED ORAL DAILY
Qty: 30 TABLET | Refills: 11 | Status: SHIPPED | OUTPATIENT
Start: 2018-07-10 | End: 2018-07-12 | Stop reason: SDUPTHER

## 2018-07-10 RX ORDER — MELOXICAM 7.5 MG/1
TABLET ORAL
Qty: 60 TABLET | Refills: 11 | Status: SHIPPED | OUTPATIENT
Start: 2018-07-10 | End: 2018-07-12 | Stop reason: SDUPTHER

## 2018-07-10 RX ORDER — NORTRIPTYLINE HYDROCHLORIDE 25 MG/1
CAPSULE ORAL
Qty: 30 CAPSULE | Refills: 11 | Status: SHIPPED | OUTPATIENT
Start: 2018-07-10 | End: 2018-09-18 | Stop reason: SDUPTHER

## 2018-07-10 RX ORDER — BACLOFEN 20 MG/1
TABLET ORAL
Qty: 90 TABLET | Refills: 11 | Status: SHIPPED | OUTPATIENT
Start: 2018-07-10 | End: 2018-09-18 | Stop reason: SDUPTHER

## 2018-07-12 DIAGNOSIS — I61.9 NONTRAUMATIC INTRACEREBRAL HEMORRHAGE, UNSPECIFIED CEREBRAL LOCATION, UNSPECIFIED LATERALITY (HCC): ICD-10-CM

## 2018-07-12 DIAGNOSIS — E78.00 PURE HYPERCHOLESTEROLEMIA: ICD-10-CM

## 2018-07-12 RX ORDER — CARBAMAZEPINE 100 MG/1
TABLET, CHEWABLE ORAL
Qty: 900 TABLET | Refills: 1 | Status: SHIPPED | OUTPATIENT
Start: 2018-07-12 | End: 2018-09-18 | Stop reason: SDUPTHER

## 2018-07-12 RX ORDER — ATORVASTATIN CALCIUM 80 MG/1
80 TABLET, FILM COATED ORAL DAILY
Qty: 90 TABLET | Refills: 1 | Status: SHIPPED | OUTPATIENT
Start: 2018-07-12 | End: 2018-09-18 | Stop reason: SDUPTHER

## 2018-07-12 RX ORDER — MELOXICAM 7.5 MG/1
TABLET ORAL
Qty: 180 TABLET | Refills: 1 | Status: SHIPPED | OUTPATIENT
Start: 2018-07-12 | End: 2018-09-18 | Stop reason: SDUPTHER

## 2018-09-12 DIAGNOSIS — Z91.81 RISK FOR FALLS: ICD-10-CM

## 2018-09-12 DIAGNOSIS — I61.9 NONTRAUMATIC INTRACEREBRAL HEMORRHAGE, UNSPECIFIED CEREBRAL LOCATION, UNSPECIFIED LATERALITY (HCC): ICD-10-CM

## 2018-09-12 DIAGNOSIS — R35.0 BENIGN PROSTATIC HYPERPLASIA WITH URINARY FREQUENCY: ICD-10-CM

## 2018-09-12 DIAGNOSIS — N40.1 BENIGN PROSTATIC HYPERPLASIA WITH URINARY FREQUENCY: ICD-10-CM

## 2018-09-12 DIAGNOSIS — E78.00 PURE HYPERCHOLESTEROLEMIA: ICD-10-CM

## 2018-09-12 LAB
ALBUMIN SERPL-MCNC: 4.2 G/DL (ref 3.5–5.2)
ALP BLD-CCNC: 61 U/L (ref 40–130)
ALT SERPL-CCNC: 27 U/L (ref 5–41)
ANION GAP SERPL CALCULATED.3IONS-SCNC: 14 MMOL/L (ref 7–19)
AST SERPL-CCNC: 21 U/L (ref 5–40)
BILIRUB SERPL-MCNC: 0.3 MG/DL (ref 0.2–1.2)
BUN BLDV-MCNC: 16 MG/DL (ref 8–23)
CALCIUM SERPL-MCNC: 9.3 MG/DL (ref 8.8–10.2)
CHLORIDE BLD-SCNC: 100 MMOL/L (ref 98–111)
CO2: 25 MMOL/L (ref 22–29)
CREAT SERPL-MCNC: 0.5 MG/DL (ref 0.5–1.2)
GFR NON-AFRICAN AMERICAN: >60
GLUCOSE BLD-MCNC: 92 MG/DL (ref 74–109)
HCT VFR BLD CALC: 41.1 % (ref 42–52)
HEMOGLOBIN: 13.7 G/DL (ref 14–18)
LDL CHOLESTEROL DIRECT: 92 MG/DL
MCH RBC QN AUTO: 32.9 PG (ref 27–31)
MCHC RBC AUTO-ENTMCNC: 33.3 G/DL (ref 33–37)
MCV RBC AUTO: 98.6 FL (ref 80–94)
PDW BLD-RTO: 12 % (ref 11.5–14.5)
PLATELET # BLD: 268 K/UL (ref 130–400)
PMV BLD AUTO: 9.3 FL (ref 9.4–12.4)
POTASSIUM SERPL-SCNC: 4.1 MMOL/L (ref 3.5–5)
RBC # BLD: 4.17 M/UL (ref 4.7–6.1)
SODIUM BLD-SCNC: 139 MMOL/L (ref 136–145)
TOTAL PROTEIN: 6.9 G/DL (ref 6.6–8.7)
WBC # BLD: 4 K/UL (ref 4.8–10.8)

## 2018-09-18 ENCOUNTER — OFFICE VISIT (OUTPATIENT)
Dept: INTERNAL MEDICINE | Age: 68
End: 2018-09-18
Payer: MEDICARE

## 2018-09-18 VITALS
DIASTOLIC BLOOD PRESSURE: 74 MMHG | HEIGHT: 71 IN | SYSTOLIC BLOOD PRESSURE: 138 MMHG | WEIGHT: 147.8 LBS | BODY MASS INDEX: 20.69 KG/M2 | OXYGEN SATURATION: 98 % | TEMPERATURE: 98.9 F | HEART RATE: 77 BPM

## 2018-09-18 DIAGNOSIS — G62.9 NEUROPATHY: ICD-10-CM

## 2018-09-18 DIAGNOSIS — R35.0 BENIGN PROSTATIC HYPERPLASIA WITH URINARY FREQUENCY: ICD-10-CM

## 2018-09-18 DIAGNOSIS — Z91.81 RISK FOR FALLS: ICD-10-CM

## 2018-09-18 DIAGNOSIS — N40.1 BENIGN PROSTATIC HYPERPLASIA WITH URINARY FREQUENCY: ICD-10-CM

## 2018-09-18 DIAGNOSIS — E78.00 PURE HYPERCHOLESTEROLEMIA: ICD-10-CM

## 2018-09-18 DIAGNOSIS — M54.2 NECK PAIN: ICD-10-CM

## 2018-09-18 DIAGNOSIS — K21.9 GASTROESOPHAGEAL REFLUX DISEASE, ESOPHAGITIS PRESENCE NOT SPECIFIED: ICD-10-CM

## 2018-09-18 DIAGNOSIS — Z00.00 ROUTINE GENERAL MEDICAL EXAMINATION AT A HEALTH CARE FACILITY: Primary | ICD-10-CM

## 2018-09-18 DIAGNOSIS — N40.1 BENIGN NON-NODULAR PROSTATIC HYPERPLASIA WITH LOWER URINARY TRACT SYMPTOMS: ICD-10-CM

## 2018-09-18 DIAGNOSIS — I61.9 NONTRAUMATIC INTRACEREBRAL HEMORRHAGE, UNSPECIFIED CEREBRAL LOCATION, UNSPECIFIED LATERALITY (HCC): ICD-10-CM

## 2018-09-18 DIAGNOSIS — R41.3 MEMORY DEFICIT: ICD-10-CM

## 2018-09-18 PROCEDURE — G8427 DOCREV CUR MEDS BY ELIG CLIN: HCPCS | Performed by: PHYSICIAN ASSISTANT

## 2018-09-18 PROCEDURE — G8420 CALC BMI NORM PARAMETERS: HCPCS | Performed by: PHYSICIAN ASSISTANT

## 2018-09-18 PROCEDURE — 1101F PT FALLS ASSESS-DOCD LE1/YR: CPT | Performed by: PHYSICIAN ASSISTANT

## 2018-09-18 PROCEDURE — 4040F PNEUMOC VAC/ADMIN/RCVD: CPT | Performed by: PHYSICIAN ASSISTANT

## 2018-09-18 PROCEDURE — 1123F ACP DISCUSS/DSCN MKR DOCD: CPT | Performed by: PHYSICIAN ASSISTANT

## 2018-09-18 PROCEDURE — 3017F COLORECTAL CA SCREEN DOC REV: CPT | Performed by: PHYSICIAN ASSISTANT

## 2018-09-18 PROCEDURE — 1036F TOBACCO NON-USER: CPT | Performed by: PHYSICIAN ASSISTANT

## 2018-09-18 PROCEDURE — 90662 IIV NO PRSV INCREASED AG IM: CPT | Performed by: PHYSICIAN ASSISTANT

## 2018-09-18 PROCEDURE — G0008 ADMIN INFLUENZA VIRUS VAC: HCPCS | Performed by: PHYSICIAN ASSISTANT

## 2018-09-18 PROCEDURE — 99214 OFFICE O/P EST MOD 30 MIN: CPT | Performed by: PHYSICIAN ASSISTANT

## 2018-09-18 RX ORDER — RANITIDINE 150 MG/1
TABLET ORAL
Qty: 90 TABLET | Refills: 3 | Status: SHIPPED | OUTPATIENT
Start: 2018-09-18

## 2018-09-18 RX ORDER — BACLOFEN 20 MG/1
TABLET ORAL
Qty: 270 TABLET | Refills: 3 | Status: SHIPPED | OUTPATIENT
Start: 2018-09-18

## 2018-09-18 RX ORDER — CARBAMAZEPINE 100 MG/1
TABLET, CHEWABLE ORAL
Qty: 900 TABLET | Refills: 3 | Status: SHIPPED | OUTPATIENT
Start: 2018-09-18 | End: 2018-12-12

## 2018-09-18 RX ORDER — ATORVASTATIN CALCIUM 80 MG/1
80 TABLET, FILM COATED ORAL DAILY
Qty: 90 TABLET | Refills: 3 | Status: SHIPPED | OUTPATIENT
Start: 2018-09-18

## 2018-09-18 RX ORDER — NORTRIPTYLINE HYDROCHLORIDE 25 MG/1
CAPSULE ORAL
Qty: 90 CAPSULE | Refills: 3 | Status: SHIPPED | OUTPATIENT
Start: 2018-09-18

## 2018-09-18 RX ORDER — MELOXICAM 7.5 MG/1
TABLET ORAL
Qty: 180 TABLET | Refills: 3 | Status: SHIPPED | OUTPATIENT
Start: 2018-09-18

## 2018-09-18 ASSESSMENT — ENCOUNTER SYMPTOMS
DIARRHEA: 0
NAUSEA: 0
SINUS PRESSURE: 0
VOMITING: 0
CONSTIPATION: 0
COLOR CHANGE: 0
BACK PAIN: 0
WHEEZING: 0
SHORTNESS OF BREATH: 0
RHINORRHEA: 0
CHEST TIGHTNESS: 0
COUGH: 0
SORE THROAT: 0
EYE REDNESS: 0
EYE PAIN: 0
PHOTOPHOBIA: 0
ABDOMINAL PAIN: 0

## 2018-09-18 ASSESSMENT — PATIENT HEALTH QUESTIONNAIRE - PHQ9
SUM OF ALL RESPONSES TO PHQ9 QUESTIONS 1 & 2: 0
SUM OF ALL RESPONSES TO PHQ QUESTIONS 1-9: 0
1. LITTLE INTEREST OR PLEASURE IN DOING THINGS: 0
SUM OF ALL RESPONSES TO PHQ QUESTIONS 1-9: 0
2. FEELING DOWN, DEPRESSED OR HOPELESS: 0
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
## 2018-12-12 DIAGNOSIS — I61.9 NONTRAUMATIC INTRACEREBRAL HEMORRHAGE, UNSPECIFIED CEREBRAL LOCATION, UNSPECIFIED LATERALITY (HCC): Primary | ICD-10-CM

## 2018-12-12 RX ORDER — CARBAMAZEPINE 200 MG/1
TABLET ORAL
Qty: 450 TABLET | Refills: 3 | Status: SHIPPED | OUTPATIENT
Start: 2018-12-12

## 2018-12-12 RX ORDER — CARBAMAZEPINE 200 MG/1
TABLET ORAL
Qty: 90 TABLET | Refills: 3 | Status: SHIPPED | OUTPATIENT
Start: 2018-12-12 | End: 2018-12-12

## 2024-12-13 ENCOUNTER — HOSPITAL ENCOUNTER (INPATIENT)
Age: 74
LOS: 5 days | Discharge: HOSPICE | DRG: 951 | End: 2024-12-18
Attending: INTERNAL MEDICINE | Admitting: INTERNAL MEDICINE
Payer: MEDICARE

## 2024-12-13 PROBLEM — I69.80: Status: ACTIVE | Noted: 2024-12-13

## 2024-12-13 LAB — SARS-COV-2 RDRP RESP QL NAA+PROBE: NOT DETECTED

## 2024-12-13 PROCEDURE — 6550000002 HC HOSPICE INPATIENT RESPITE

## 2024-12-13 PROCEDURE — 87635 SARS-COV-2 COVID-19 AMP PRB: CPT

## 2024-12-13 PROCEDURE — 6560000002 HC HOSPICE GENERAL INPATIENT

## 2024-12-13 PROCEDURE — 6370000000 HC RX 637 (ALT 250 FOR IP): Performed by: INTERNAL MEDICINE

## 2024-12-13 RX ORDER — CARBAMAZEPINE 200 MG/1
200 TABLET ORAL 4 TIMES DAILY
Status: DISCONTINUED | OUTPATIENT
Start: 2024-12-13 | End: 2024-12-18 | Stop reason: HOSPADM

## 2024-12-13 RX ORDER — PROCHLORPERAZINE MALEATE 10 MG
10 TABLET ORAL EVERY 6 HOURS PRN
Status: DISCONTINUED | OUTPATIENT
Start: 2024-12-13 | End: 2024-12-18 | Stop reason: HOSPADM

## 2024-12-13 RX ORDER — ACETAMINOPHEN 500 MG
500 TABLET ORAL EVERY 8 HOURS PRN
Status: DISCONTINUED | OUTPATIENT
Start: 2024-12-13 | End: 2024-12-18 | Stop reason: HOSPADM

## 2024-12-13 RX ORDER — NORTRIPTYLINE HYDROCHLORIDE 25 MG/1
25 CAPSULE ORAL NIGHTLY
Status: DISCONTINUED | OUTPATIENT
Start: 2024-12-13 | End: 2024-12-18 | Stop reason: HOSPADM

## 2024-12-13 RX ORDER — MORPHINE SULFATE 20 MG/ML
5 SOLUTION ORAL
Status: DISCONTINUED | OUTPATIENT
Start: 2024-12-13 | End: 2024-12-18 | Stop reason: HOSPADM

## 2024-12-13 RX ORDER — FINASTERIDE 5 MG/1
5 TABLET, FILM COATED ORAL DAILY
Status: DISCONTINUED | OUTPATIENT
Start: 2024-12-14 | End: 2024-12-18 | Stop reason: HOSPADM

## 2024-12-13 RX ORDER — MELOXICAM 7.5 MG/1
7.5 TABLET ORAL 2 TIMES DAILY
Status: DISCONTINUED | OUTPATIENT
Start: 2024-12-13 | End: 2024-12-18 | Stop reason: HOSPADM

## 2024-12-13 RX ORDER — SENNOSIDES A AND B 8.6 MG/1
1 TABLET, FILM COATED ORAL
Status: DISCONTINUED | OUTPATIENT
Start: 2024-12-13 | End: 2024-12-18 | Stop reason: HOSPADM

## 2024-12-13 RX ORDER — LATANOPROST 50 UG/ML
1 SOLUTION/ DROPS OPHTHALMIC NIGHTLY
Status: DISCONTINUED | OUTPATIENT
Start: 2024-12-14 | End: 2024-12-18 | Stop reason: HOSPADM

## 2024-12-13 RX ORDER — MIDODRINE HYDROCHLORIDE 5 MG/1
5 TABLET ORAL 3 TIMES DAILY PRN
Status: DISCONTINUED | OUTPATIENT
Start: 2024-12-13 | End: 2024-12-18 | Stop reason: HOSPADM

## 2024-12-13 RX ORDER — PANTOPRAZOLE SODIUM 40 MG/1
40 TABLET, DELAYED RELEASE ORAL
Status: DISCONTINUED | OUTPATIENT
Start: 2024-12-14 | End: 2024-12-18 | Stop reason: HOSPADM

## 2024-12-13 RX ORDER — TIZANIDINE 2 MG/1
4 TABLET ORAL EVERY 8 HOURS PRN
Status: DISCONTINUED | OUTPATIENT
Start: 2024-12-13 | End: 2024-12-18 | Stop reason: HOSPADM

## 2024-12-13 RX ORDER — TAMSULOSIN HYDROCHLORIDE 0.4 MG/1
0.4 CAPSULE ORAL
Status: DISCONTINUED | OUTPATIENT
Start: 2024-12-13 | End: 2024-12-18 | Stop reason: HOSPADM

## 2024-12-13 RX ORDER — BACLOFEN 10 MG/1
10 TABLET ORAL 3 TIMES DAILY
Status: DISCONTINUED | OUTPATIENT
Start: 2024-12-13 | End: 2024-12-18 | Stop reason: HOSPADM

## 2024-12-13 RX ORDER — OXYCODONE HYDROCHLORIDE 5 MG/1
5 TABLET ORAL EVERY 4 HOURS PRN
Status: DISCONTINUED | OUTPATIENT
Start: 2024-12-13 | End: 2024-12-18 | Stop reason: HOSPADM

## 2024-12-13 RX ORDER — BISACODYL 10 MG
10 SUPPOSITORY, RECTAL RECTAL DAILY PRN
Status: DISCONTINUED | OUTPATIENT
Start: 2024-12-13 | End: 2024-12-18 | Stop reason: HOSPADM

## 2024-12-13 RX ORDER — LORAZEPAM 0.5 MG/1
0.5 TABLET ORAL EVERY 6 HOURS PRN
Status: DISCONTINUED | OUTPATIENT
Start: 2024-12-13 | End: 2024-12-18 | Stop reason: HOSPADM

## 2024-12-13 RX ORDER — TIMOLOL MALEATE 5 MG/ML
1 SOLUTION/ DROPS OPHTHALMIC 2 TIMES DAILY
Status: DISCONTINUED | OUTPATIENT
Start: 2024-12-14 | End: 2024-12-18 | Stop reason: HOSPADM

## 2024-12-13 RX ORDER — HALOPERIDOL 2 MG/ML
1 SOLUTION ORAL EVERY 6 HOURS PRN
Status: DISCONTINUED | OUTPATIENT
Start: 2024-12-13 | End: 2024-12-18 | Stop reason: HOSPADM

## 2024-12-13 RX ADMIN — TAMSULOSIN HYDROCHLORIDE 0.4 MG: 0.4 CAPSULE ORAL at 20:43

## 2024-12-13 RX ADMIN — BACLOFEN 10 MG: 10 TABLET ORAL at 14:38

## 2024-12-13 RX ADMIN — NORTRIPTYLINE HYDROCHLORIDE 25 MG: 25 CAPSULE ORAL at 20:43

## 2024-12-13 RX ADMIN — BACLOFEN 10 MG: 10 TABLET ORAL at 20:43

## 2024-12-13 RX ADMIN — OXYCODONE HYDROCHLORIDE 5 MG: 5 TABLET ORAL at 16:22

## 2024-12-13 RX ADMIN — CARBAMAZEPINE 200 MG: 200 TABLET ORAL at 14:38

## 2024-12-13 RX ADMIN — MELOXICAM 7.5 MG: 7.5 TABLET ORAL at 20:43

## 2024-12-13 RX ADMIN — CARBAMAZEPINE 200 MG: 200 TABLET ORAL at 23:05

## 2024-12-13 RX ADMIN — CARBAMAZEPINE 200 MG: 200 TABLET ORAL at 19:04

## 2024-12-13 ASSESSMENT — PAIN DESCRIPTION - LOCATION: LOCATION: NECK

## 2024-12-13 ASSESSMENT — PAIN DESCRIPTION - DESCRIPTORS: DESCRIPTORS: ACHING;DISCOMFORT

## 2024-12-13 ASSESSMENT — PAIN DESCRIPTION - ORIENTATION: ORIENTATION: MID

## 2024-12-13 NOTE — PROGRESS NOTES
Pt arrived to [facility] via [facility] for respite care to room 4. Dr. Will notified. Continue home medications.

## 2024-12-13 NOTE — PROGRESS NOTES
Pt is lying in bed, spouse Carol at bedside. Hospice stay discussed with spouse. Consent form and [place] [facility] DNR signed per spouse. No further needs voiced at this time per patient or spouse.Pt's bed in lowest position, locked with side rails raised x 3, call light within reach, and frequent checks by staff.

## 2024-12-13 NOTE — PROGRESS NOTES
Mr Francisco Javier Maher was brought in for a respite stay today via non-emergent EMS.  His wife Carol is his caregiver at home.  I called her re: signing consent For [facility] and she stated she coming into [facility] soon and would sign along with EMS-DNR.  Mr. Maher is able to answer questions appropriately but not able to sign due to stroke.  The plan is for Francisco Javier to return home via non-emergent EMS on Wednesday and Caorl verified this.

## 2024-12-14 PROCEDURE — 6550000002 HC HOSPICE INPATIENT RESPITE

## 2024-12-14 PROCEDURE — 6560000002 HC HOSPICE GENERAL INPATIENT

## 2024-12-14 PROCEDURE — 6370000000 HC RX 637 (ALT 250 FOR IP): Performed by: INTERNAL MEDICINE

## 2024-12-14 RX ADMIN — BACLOFEN 10 MG: 10 TABLET ORAL at 21:06

## 2024-12-14 RX ADMIN — MELOXICAM 7.5 MG: 7.5 TABLET ORAL at 21:07

## 2024-12-14 RX ADMIN — BACLOFEN 10 MG: 10 TABLET ORAL at 08:15

## 2024-12-14 RX ADMIN — FINASTERIDE 5 MG: 5 TABLET, FILM COATED ORAL at 08:15

## 2024-12-14 RX ADMIN — OXYCODONE HYDROCHLORIDE 5 MG: 5 TABLET ORAL at 18:16

## 2024-12-14 RX ADMIN — CARBAMAZEPINE 200 MG: 200 TABLET ORAL at 12:35

## 2024-12-14 RX ADMIN — Medication 5 MG: at 15:54

## 2024-12-14 RX ADMIN — PROCHLORPERAZINE MALEATE 10 MG: 10 TABLET ORAL at 21:06

## 2024-12-14 RX ADMIN — CARBAMAZEPINE 200 MG: 200 TABLET ORAL at 17:52

## 2024-12-14 RX ADMIN — MELOXICAM 7.5 MG: 7.5 TABLET ORAL at 08:15

## 2024-12-14 RX ADMIN — Medication 5 MG: at 12:35

## 2024-12-14 RX ADMIN — LATANOPROST 1 DROP: 50 SOLUTION/ DROPS OPHTHALMIC at 21:08

## 2024-12-14 RX ADMIN — NORTRIPTYLINE HYDROCHLORIDE 25 MG: 25 CAPSULE ORAL at 21:06

## 2024-12-14 RX ADMIN — TAMSULOSIN HYDROCHLORIDE 0.4 MG: 0.4 CAPSULE ORAL at 21:07

## 2024-12-14 RX ADMIN — BACLOFEN 10 MG: 10 TABLET ORAL at 12:35

## 2024-12-14 RX ADMIN — PANTOPRAZOLE SODIUM 40 MG: 40 TABLET, DELAYED RELEASE ORAL at 06:21

## 2024-12-14 RX ADMIN — TIMOLOL MALEATE 1 DROP: 5 SOLUTION/ DROPS OPHTHALMIC at 08:25

## 2024-12-14 RX ADMIN — CARBAMAZEPINE 200 MG: 200 TABLET ORAL at 21:07

## 2024-12-14 RX ADMIN — CARBAMAZEPINE 200 MG: 200 TABLET ORAL at 08:15

## 2024-12-14 RX ADMIN — TIMOLOL MALEATE 1 DROP: 5 SOLUTION/ DROPS OPHTHALMIC at 21:08

## 2024-12-14 RX ADMIN — OXYCODONE HYDROCHLORIDE 5 MG: 5 TABLET ORAL at 21:07

## 2024-12-14 ASSESSMENT — PAIN DESCRIPTION - DESCRIPTORS: DESCRIPTORS: ACHING

## 2024-12-14 ASSESSMENT — PAIN - FUNCTIONAL ASSESSMENT: PAIN_FUNCTIONAL_ASSESSMENT: PREVENTS OR INTERFERES SOME ACTIVE ACTIVITIES AND ADLS

## 2024-12-14 NOTE — PROGRESS NOTES
This SN fed patient his supper tray, he ate approx 25% of his supper. No trouble with swallowing. Pt's bed is in lowest position, locked with side rails raised x 3, call light within reach and frequent checks by staff.  No medical needs at this time.

## 2024-12-14 NOTE — PROGRESS NOTES
Change of shift report and rounds. Patient awake in bed. Alert. Denies needs at this time. Bed in lowest position and locked. Call bell within reach. Upper rails up x 2.

## 2024-12-14 NOTE — PROGRESS NOTES
Pt is lying in bed watching TV. No c/o pain or discomfort at this time. Pt's bed in lowest position, locked with side rails raised x 3, call light within reach, and frequent checks by staff. No needs at this time.

## 2024-12-14 NOTE — PROGRESS NOTES
Patient's wife requested pain medication for him-states sometimes he does not ask for medicine but he needs it anyways. Explained to patient's wife that we had asked patient multiple times this shift if he was in any pain and patient had denied pain. Morphine 5mg administered orally.

## 2024-12-14 NOTE — PROGRESS NOTES
SN present at the patient's bedside along with the PCA to reposition the patient. Patient had urinated and the brief and the patient's shirt was changed. Patient's spouse was present and became upset with patient being moved. Patient's spouse states patient's left arm had been injured in the past and should be moved in a less aggressive manner. Explained to the patient's spouse we were being gentle but the patient had to be repositioned and cleaned to avoid skin breakdown. Patient's spouse remained adamant about patient not being repositioned.Will alert patient's nurse of the issue.

## 2024-12-14 NOTE — PROGRESS NOTES
SN at the patient's bedside along with the night nurses for unit rounds. Patient is alert with no signs or symptoms of pain or restlessness noted. The bed is in the lowest position with the wheels locked. The call light is within reach. No family is present st the bedside. Will continue to monitor.

## 2024-12-15 PROCEDURE — 6370000000 HC RX 637 (ALT 250 FOR IP): Performed by: INTERNAL MEDICINE

## 2024-12-15 PROCEDURE — 6560000002 HC HOSPICE GENERAL INPATIENT

## 2024-12-15 PROCEDURE — 6550000002 HC HOSPICE INPATIENT RESPITE

## 2024-12-15 RX ORDER — METOPROLOL TARTRATE 25 MG/1
25 TABLET, FILM COATED ORAL 2 TIMES DAILY
Status: DISCONTINUED | OUTPATIENT
Start: 2024-12-15 | End: 2024-12-18 | Stop reason: HOSPADM

## 2024-12-15 RX ADMIN — OXYCODONE HYDROCHLORIDE 5 MG: 5 TABLET ORAL at 14:50

## 2024-12-15 RX ADMIN — TIMOLOL MALEATE 1 DROP: 5 SOLUTION/ DROPS OPHTHALMIC at 20:31

## 2024-12-15 RX ADMIN — NORTRIPTYLINE HYDROCHLORIDE 25 MG: 25 CAPSULE ORAL at 20:32

## 2024-12-15 RX ADMIN — MELOXICAM 7.5 MG: 7.5 TABLET ORAL at 08:19

## 2024-12-15 RX ADMIN — TIZANIDINE 4 MG: 2 TABLET ORAL at 08:19

## 2024-12-15 RX ADMIN — BACLOFEN 10 MG: 10 TABLET ORAL at 20:33

## 2024-12-15 RX ADMIN — METOPROLOL TARTRATE 25 MG: 25 TABLET, FILM COATED ORAL at 20:32

## 2024-12-15 RX ADMIN — PANTOPRAZOLE SODIUM 40 MG: 40 TABLET, DELAYED RELEASE ORAL at 06:13

## 2024-12-15 RX ADMIN — OXYCODONE HYDROCHLORIDE 5 MG: 5 TABLET ORAL at 06:13

## 2024-12-15 RX ADMIN — CARBAMAZEPINE 200 MG: 200 TABLET ORAL at 20:33

## 2024-12-15 RX ADMIN — LATANOPROST 1 DROP: 50 SOLUTION/ DROPS OPHTHALMIC at 20:32

## 2024-12-15 RX ADMIN — BACLOFEN 10 MG: 10 TABLET ORAL at 14:50

## 2024-12-15 RX ADMIN — CARBAMAZEPINE 200 MG: 200 TABLET ORAL at 14:50

## 2024-12-15 RX ADMIN — METOPROLOL TARTRATE 25 MG: 25 TABLET, FILM COATED ORAL at 15:37

## 2024-12-15 RX ADMIN — TIMOLOL MALEATE 1 DROP: 5 SOLUTION/ DROPS OPHTHALMIC at 08:57

## 2024-12-15 RX ADMIN — FINASTERIDE 5 MG: 5 TABLET, FILM COATED ORAL at 08:19

## 2024-12-15 RX ADMIN — CARBAMAZEPINE 200 MG: 200 TABLET ORAL at 08:19

## 2024-12-15 RX ADMIN — TAMSULOSIN HYDROCHLORIDE 0.4 MG: 0.4 CAPSULE ORAL at 20:33

## 2024-12-15 RX ADMIN — MELOXICAM 7.5 MG: 7.5 TABLET ORAL at 20:33

## 2024-12-15 RX ADMIN — BACLOFEN 10 MG: 10 TABLET ORAL at 08:19

## 2024-12-15 NOTE — PROGRESS NOTES
Patient's morning medications administered PO without difficulty. Patient's wife bedside. PRN zanaflex administered with morning meds for noted muscle spasticity during physical head to toe exam prior to medications.

## 2024-12-15 NOTE — PROGRESS NOTES
Patient took scheduled medications without difficulty. PRN oxycodone administered for comfort. Family bedside.

## 2024-12-15 NOTE — PROGRESS NOTES
Dr. Will in hospice care center at this time. New order received for blood pressure medication due to hypertension during respite stay.

## 2024-12-15 NOTE — PROGRESS NOTES
Change of shift report and rounds. Patient in bed with eyes closed. Bed in lowest position and locked. Call bell within reach. Upper rails up x 2.

## 2024-12-15 NOTE — PROGRESS NOTES
Patient awake and resting in bed during shift change rounds. Patient's pillows repositioned for comfort. Denies pain at this time.

## 2024-12-15 NOTE — PROGRESS NOTES
Pt report and rounding with off going shift staff.  Pt was sleeping and awakened while staff at bedside.  Pt's head was repositioned for comfort.  Pt's bed in lowest positioned, locked with side raised x 3, call light within reach, and frequent checks by staff.

## 2024-12-15 NOTE — PROGRESS NOTES
SN present at the patient's bedside to administer Oxycodone due to the patient complaining of pain. Oxycodone administered without difficulty. No family is present at the bedside. Will follow up with the patient.

## 2024-12-15 NOTE — PROGRESS NOTES
Patient given PRN prochlorperazine and PRN oxycodone with night meds. Wife  said he needs pain medications even when he doesn't ask because he wont report pain. Took all medications whole with water without difficulty

## 2024-12-15 NOTE — PROGRESS NOTES
Dr. Will in hospice care center. Patient remains respite level of care with no new orders received.

## 2024-12-15 NOTE — PROGRESS NOTES
Follow up PRN prochlorperazine and PRN oxycodone- patient resting quietly with eyes closed. No signs of pain or nausea

## 2024-12-16 PROCEDURE — 6550000002 HC HOSPICE INPATIENT RESPITE

## 2024-12-16 PROCEDURE — 6370000000 HC RX 637 (ALT 250 FOR IP): Performed by: INTERNAL MEDICINE

## 2024-12-16 RX ADMIN — LATANOPROST 1 DROP: 50 SOLUTION/ DROPS OPHTHALMIC at 20:33

## 2024-12-16 RX ADMIN — MELOXICAM 7.5 MG: 7.5 TABLET ORAL at 20:32

## 2024-12-16 RX ADMIN — TAMSULOSIN HYDROCHLORIDE 0.4 MG: 0.4 CAPSULE ORAL at 20:32

## 2024-12-16 RX ADMIN — FINASTERIDE 5 MG: 5 TABLET, FILM COATED ORAL at 08:45

## 2024-12-16 RX ADMIN — BACLOFEN 10 MG: 10 TABLET ORAL at 20:32

## 2024-12-16 RX ADMIN — NORTRIPTYLINE HYDROCHLORIDE 25 MG: 25 CAPSULE ORAL at 20:32

## 2024-12-16 RX ADMIN — CARBAMAZEPINE 200 MG: 200 TABLET ORAL at 20:32

## 2024-12-16 RX ADMIN — MELOXICAM 7.5 MG: 7.5 TABLET ORAL at 08:45

## 2024-12-16 RX ADMIN — CARBAMAZEPINE 200 MG: 200 TABLET ORAL at 13:18

## 2024-12-16 RX ADMIN — BACLOFEN 10 MG: 10 TABLET ORAL at 13:18

## 2024-12-16 RX ADMIN — CARBAMAZEPINE 200 MG: 200 TABLET ORAL at 17:07

## 2024-12-16 RX ADMIN — CARBAMAZEPINE 200 MG: 200 TABLET ORAL at 08:45

## 2024-12-16 RX ADMIN — TIMOLOL MALEATE 1 DROP: 5 SOLUTION/ DROPS OPHTHALMIC at 08:47

## 2024-12-16 RX ADMIN — BACLOFEN 10 MG: 10 TABLET ORAL at 08:45

## 2024-12-16 RX ADMIN — PANTOPRAZOLE SODIUM 40 MG: 40 TABLET, DELAYED RELEASE ORAL at 06:45

## 2024-12-16 RX ADMIN — TIMOLOL MALEATE 1 DROP: 5 SOLUTION/ DROPS OPHTHALMIC at 20:33

## 2024-12-16 RX ADMIN — METOPROLOL TARTRATE 25 MG: 25 TABLET, FILM COATED ORAL at 20:31

## 2024-12-16 RX ADMIN — METOPROLOL TARTRATE 25 MG: 25 TABLET, FILM COATED ORAL at 08:46

## 2024-12-16 NOTE — PROGRESS NOTES
Gave pt scheduled medications. Pt swallowed one at a time and did better vs all at once where  he seems to be having issues swallowing. Wife at bedside.

## 2024-12-16 NOTE — PROGRESS NOTES
Francisco Javier Maher remains respite LOC in [address] at the [facility].    He appears to be asleep (with eyes closed) but does answer my questions when I talk to him.  His wife had been in and signed EMS DNR.  He does not verbalize any complaints but just wants to sleep.  Plan remains to return home on Wednesday via non-emergent EMS.

## 2024-12-16 NOTE — PROGRESS NOTES
Dr Will on HCC unit and is aware of Pt's Respite level of care.  Pt has no new needs at this time and remains at Respite level of care.     In 4 days pt. to advance to stage IV bariatric diet, pt. to consume >75% PO

## 2024-12-16 NOTE — PROGRESS NOTES
The pt is lying in bed.  He did open his eyes when his name was called.  No attempt to speak.  No needs identified.  Prayer support provided.  No family present.  Will follow.

## 2024-12-16 NOTE — PROGRESS NOTES
Pt report and rounding with off going shift staff. Pt was sleeping and awakened while staff at bedside. Pt acknowledged staff and denied needs.   Pt's bed in lowest positioned, locked with side raised x 3, call light within reach, and frequent checks by staff.

## 2024-12-17 PROCEDURE — 6550000002 HC HOSPICE INPATIENT RESPITE

## 2024-12-17 PROCEDURE — 6370000000 HC RX 637 (ALT 250 FOR IP): Performed by: INTERNAL MEDICINE

## 2024-12-17 RX ORDER — FENTANYL 12.5 UG/1
1 PATCH TRANSDERMAL
Status: DISCONTINUED | OUTPATIENT
Start: 2024-12-17 | End: 2024-12-18 | Stop reason: HOSPADM

## 2024-12-17 RX ADMIN — CARBAMAZEPINE 200 MG: 200 TABLET ORAL at 17:11

## 2024-12-17 RX ADMIN — Medication 5 MG: at 08:42

## 2024-12-17 RX ADMIN — MELOXICAM 7.5 MG: 7.5 TABLET ORAL at 19:53

## 2024-12-17 RX ADMIN — Medication 5 MG: at 19:53

## 2024-12-17 RX ADMIN — Medication 5 MG: at 11:18

## 2024-12-17 RX ADMIN — CARBAMAZEPINE 200 MG: 200 TABLET ORAL at 07:37

## 2024-12-17 RX ADMIN — LATANOPROST 1 DROP: 50 SOLUTION/ DROPS OPHTHALMIC at 19:56

## 2024-12-17 RX ADMIN — BACLOFEN 10 MG: 10 TABLET ORAL at 12:50

## 2024-12-17 RX ADMIN — METOPROLOL TARTRATE 25 MG: 25 TABLET, FILM COATED ORAL at 19:53

## 2024-12-17 RX ADMIN — BACLOFEN 10 MG: 10 TABLET ORAL at 19:52

## 2024-12-17 RX ADMIN — TIZANIDINE 4 MG: 2 TABLET ORAL at 05:36

## 2024-12-17 RX ADMIN — NORTRIPTYLINE HYDROCHLORIDE 25 MG: 25 CAPSULE ORAL at 19:52

## 2024-12-17 RX ADMIN — TAMSULOSIN HYDROCHLORIDE 0.4 MG: 0.4 CAPSULE ORAL at 19:52

## 2024-12-17 RX ADMIN — CARBAMAZEPINE 200 MG: 200 TABLET ORAL at 19:52

## 2024-12-17 RX ADMIN — BACLOFEN 10 MG: 10 TABLET ORAL at 07:37

## 2024-12-17 RX ADMIN — MELOXICAM 7.5 MG: 7.5 TABLET ORAL at 07:37

## 2024-12-17 RX ADMIN — TIMOLOL MALEATE 1 DROP: 5 SOLUTION/ DROPS OPHTHALMIC at 08:04

## 2024-12-17 RX ADMIN — TIMOLOL MALEATE 1 DROP: 5 SOLUTION/ DROPS OPHTHALMIC at 19:56

## 2024-12-17 RX ADMIN — OXYCODONE HYDROCHLORIDE 5 MG: 5 TABLET ORAL at 17:11

## 2024-12-17 RX ADMIN — LORAZEPAM 0.5 MG: 0.5 TABLET ORAL at 17:12

## 2024-12-17 RX ADMIN — PANTOPRAZOLE SODIUM 40 MG: 40 TABLET, DELAYED RELEASE ORAL at 05:36

## 2024-12-17 RX ADMIN — FINASTERIDE 5 MG: 5 TABLET, FILM COATED ORAL at 07:37

## 2024-12-17 RX ADMIN — CARBAMAZEPINE 200 MG: 200 TABLET ORAL at 12:50

## 2024-12-17 RX ADMIN — METOPROLOL TARTRATE 25 MG: 25 TABLET, FILM COATED ORAL at 07:37

## 2024-12-17 RX ADMIN — Medication 5 MG: at 14:46

## 2024-12-17 ASSESSMENT — PAIN DESCRIPTION - DESCRIPTORS: DESCRIPTORS: NAGGING

## 2024-12-17 ASSESSMENT — PAIN SCALES - GENERAL: PAINLEVEL_OUTOF10: 5

## 2024-12-17 ASSESSMENT — PAIN DESCRIPTION - LOCATION: LOCATION: NECK;SHOULDER

## 2024-12-17 ASSESSMENT — PAIN DESCRIPTION - ORIENTATION: ORIENTATION: LEFT

## 2024-12-17 NOTE — PROGRESS NOTES
Patient incontinent, cleaned and brief changed. Turned and repositioned to his back and pillows used for support and to float heels off bed. Noted muscle spasms in left arm, will medicate.    0536 Zanaflex 4 mg crushed and given in applesauce, did not spit medication out this morning.    0610 PRN follow up  Effective

## 2024-12-17 NOTE — PROGRESS NOTES
in [facility] rounding. Patient remains respite level of care. No needs at this time. D/C plan still on for tomorrow.

## 2024-12-17 NOTE — PROGRESS NOTES
Fentanyl patch 12 mcg/hr placed to RUE for pain relief. Explained to wife how to apply it and education on S/E and dose. She verbalized understanding. Morphine 5 mg PO given for pain in the base of his neck

## 2024-12-17 NOTE — PROGRESS NOTES
Bedside rounds and patient oozing small amount of stool, checked for impaction and large amount of stool felt in anal vault. SN slowly removed large amount hard formed impacted stool, patient tolerated fair.Turned and repositioned to left side with assist per Nena QUINTANILLA and SN, pillows used for support and to float heels off bed. Bed in low position/locked and SR up x2, call light within reach.

## 2024-12-17 NOTE — PROGRESS NOTES
Francisco Javier Maher remains respite LOC in [address] at the [facility].    He is awake.  His affect is flat but this seems to be his norm.  He wants to be readjusted in the bed.  I got RN Tracie and RN Destiney for assist.  He voiced  no other complaints today.  I made him aware that he is going home tomorrow.  He asked about Carol but did not want me to call her from bedside.   I called [facility] to set up non-emergent EMS for 11am on Wednesday.  I  notified [facility] staff of this request.  I called Carol to inform of the above.  She asked about how often she can use respite stay and I advised that generally it is once per benefit period unless a need arises re: safety.  She asked about options for care if she cannot continue care at home.  I discussed paid help (she states she has some paid help currently) and then we discussed Nursing Home Placement.  She stated that she is not ready for this yet but wanted to gather some information.  I notified Chelita Perez of this as well.

## 2024-12-17 NOTE — PROGRESS NOTES
The pt is sitting up in bed.  His eyes are open but he did not speak or respond. Prayer support provided. No needs identified. No family present.  Will follow.

## 2024-12-17 NOTE — PLAN OF CARE
Problem: Discharge Planning  Goal: Discharge to home or other facility with appropriate resources  Outcome: Progressing  Flowsheets (Taken 12/16/2024 1920)  Discharge to home or other facility with appropriate resources: Identify barriers to discharge with patient and caregiver     Problem: Safety - Adult  Goal: Free from fall injury  Outcome: Progressing     Problem: Skin/Tissue Integrity  Goal: Absence of new skin breakdown  Description: 1.  Monitor for areas of redness and/or skin breakdown  2.  Assess vascular access sites hourly  3.  Every 4-6 hours minimum:  Change oxygen saturation probe site  4.  Every 4-6 hours:  If on nasal continuous positive airway pressure, respiratory therapy assess nares and determine need for appliance change or resting period.  Outcome: Progressing     Problem: Pain  Goal: Verbalizes/displays adequate comfort level or baseline comfort level  Outcome: Progressing

## 2024-12-18 VITALS
HEART RATE: 79 BPM | TEMPERATURE: 97.7 F | DIASTOLIC BLOOD PRESSURE: 93 MMHG | SYSTOLIC BLOOD PRESSURE: 149 MMHG | OXYGEN SATURATION: 95 % | RESPIRATION RATE: 20 BRPM

## 2024-12-18 PROCEDURE — 6370000000 HC RX 637 (ALT 250 FOR IP): Performed by: INTERNAL MEDICINE

## 2024-12-18 RX ADMIN — OXYCODONE HYDROCHLORIDE 5 MG: 5 TABLET ORAL at 08:06

## 2024-12-18 RX ADMIN — Medication 5 MG: at 05:31

## 2024-12-18 RX ADMIN — CARBAMAZEPINE 200 MG: 200 TABLET ORAL at 08:06

## 2024-12-18 RX ADMIN — METOPROLOL TARTRATE 25 MG: 25 TABLET, FILM COATED ORAL at 08:06

## 2024-12-18 RX ADMIN — BACLOFEN 10 MG: 10 TABLET ORAL at 08:06

## 2024-12-18 RX ADMIN — TIMOLOL MALEATE 1 DROP: 5 SOLUTION/ DROPS OPHTHALMIC at 08:06

## 2024-12-18 RX ADMIN — PANTOPRAZOLE SODIUM 40 MG: 40 TABLET, DELAYED RELEASE ORAL at 05:31

## 2024-12-18 RX ADMIN — FINASTERIDE 5 MG: 5 TABLET, FILM COATED ORAL at 08:06

## 2024-12-18 RX ADMIN — MELOXICAM 7.5 MG: 7.5 TABLET ORAL at 08:06

## 2024-12-18 RX ADMIN — TIZANIDINE 4 MG: 2 TABLET ORAL at 08:06

## 2024-12-18 ASSESSMENT — PAIN DESCRIPTION - LOCATION: LOCATION: NECK

## 2024-12-18 ASSESSMENT — PAIN SCALES - GENERAL: PAINLEVEL_OUTOF10: 7

## 2024-12-18 NOTE — PROGRESS NOTES
Scheduled  medications given and administered PRN ROXANOL 5 MG for pain he rates as 8/10 to neck/shoulders.  Will continue to monitor    At 2050, follow up for prn Roxanol 5 mg given.  Patient states his pain is better.  Rates as 6/10 now instead of 8.  Will continue to monitor.

## 2024-12-18 NOTE — PROGRESS NOTES
Patient is incontinent of bladder.  Patient has agreed to have bed bath and hair washed.  Patient is hurting.  PRN ROXANOL 5 MG GIVEN.  Bath given and hair washed.  Teeth brushed.  Incontinent care provided with bath.  Brief changed and fresh shirt placed and bed linens.  Patient tolerated well.      0615, follow up for prn roxanol 5 mg given.  Patient is resting well with eyes closed, respirations unlabored.  FLACC 0.  Safety precautions in place.

## 2024-12-18 NOTE — PROGRESS NOTES
Rounding with change of shift report from Tracie QUINTANILLA.  Patient is resting in bed, smiles with introductions.  Safety precautions in place of bed in low position, locked.  Upper side rails x 2 raised.  Call light within reach.

## 2024-12-18 NOTE — PROGRESS NOTES
[facility] arrived to  patient. Report called to Kayla with Chelita. Patient's wife notified of his pending arrival home. Medication list faxed to [facility] with new addition of Fentanyl patch.

## 2024-12-18 NOTE — PROGRESS NOTES
Dr. Will in [facility], patient remains respite level of care. No new orders received.

## 2024-12-18 NOTE — PROGRESS NOTES
Patient resting in bed with eyes closed during rounds with offgoing staff. Respirations even and unlabored, no distress noted at this time. Bed rails up x3. Bed locked in lowest position.

## 2025-01-15 ENCOUNTER — HOSPITAL ENCOUNTER (INPATIENT)
Age: 75
LOS: 5 days | Discharge: HOSPICE | DRG: 951 | End: 2025-01-20
Attending: INTERNAL MEDICINE | Admitting: INTERNAL MEDICINE
Payer: MEDICARE

## 2025-01-15 PROBLEM — I69.818: Status: ACTIVE | Noted: 2025-01-15

## 2025-01-15 LAB — SARS-COV-2 RDRP RESP QL NAA+PROBE: NOT DETECTED

## 2025-01-15 PROCEDURE — 6550000002 HC HOSPICE INPATIENT RESPITE

## 2025-01-15 PROCEDURE — 6370000000 HC RX 637 (ALT 250 FOR IP): Performed by: INTERNAL MEDICINE

## 2025-01-15 PROCEDURE — 6560000002 HC HOSPICE GENERAL INPATIENT

## 2025-01-15 PROCEDURE — 87635 SARS-COV-2 COVID-19 AMP PRB: CPT

## 2025-01-15 RX ORDER — LACTULOSE 10 G/15ML
10 SOLUTION ORAL 2 TIMES DAILY
Status: DISCONTINUED | OUTPATIENT
Start: 2025-01-15 | End: 2025-01-20 | Stop reason: HOSPADM

## 2025-01-15 RX ORDER — PANTOPRAZOLE SODIUM 40 MG/1
40 TABLET, DELAYED RELEASE ORAL
Status: DISCONTINUED | OUTPATIENT
Start: 2025-01-16 | End: 2025-01-20 | Stop reason: HOSPADM

## 2025-01-15 RX ORDER — TIZANIDINE 2 MG/1
4 TABLET ORAL EVERY 8 HOURS PRN
Status: DISCONTINUED | OUTPATIENT
Start: 2025-01-15 | End: 2025-01-20 | Stop reason: HOSPADM

## 2025-01-15 RX ORDER — MIDODRINE HYDROCHLORIDE 5 MG/1
5 TABLET ORAL 3 TIMES DAILY PRN
Status: DISCONTINUED | OUTPATIENT
Start: 2025-01-15 | End: 2025-01-20 | Stop reason: HOSPADM

## 2025-01-15 RX ORDER — FENTANYL 12.5 UG/1
1 PATCH TRANSDERMAL
Status: DISCONTINUED | OUTPATIENT
Start: 2025-01-15 | End: 2025-01-19

## 2025-01-15 RX ORDER — LATANOPROST 50 UG/ML
1 SOLUTION/ DROPS OPHTHALMIC NIGHTLY
Status: DISCONTINUED | OUTPATIENT
Start: 2025-01-15 | End: 2025-01-20 | Stop reason: HOSPADM

## 2025-01-15 RX ORDER — FINASTERIDE 5 MG/1
5 TABLET, FILM COATED ORAL DAILY
Status: DISCONTINUED | OUTPATIENT
Start: 2025-01-16 | End: 2025-01-20 | Stop reason: HOSPADM

## 2025-01-15 RX ORDER — AMLODIPINE BESYLATE 5 MG/1
2.5 TABLET ORAL DAILY
Status: DISCONTINUED | OUTPATIENT
Start: 2025-01-16 | End: 2025-01-20 | Stop reason: HOSPADM

## 2025-01-15 RX ORDER — MELOXICAM 7.5 MG/1
7.5 TABLET ORAL 2 TIMES DAILY
Status: DISCONTINUED | OUTPATIENT
Start: 2025-01-15 | End: 2025-01-20 | Stop reason: HOSPADM

## 2025-01-15 RX ORDER — BISACODYL 10 MG
10 SUPPOSITORY, RECTAL RECTAL DAILY PRN
Status: DISCONTINUED | OUTPATIENT
Start: 2025-01-15 | End: 2025-01-20 | Stop reason: HOSPADM

## 2025-01-15 RX ORDER — PROCHLORPERAZINE MALEATE 10 MG
10 TABLET ORAL EVERY 6 HOURS PRN
Status: DISCONTINUED | OUTPATIENT
Start: 2025-01-15 | End: 2025-01-20 | Stop reason: HOSPADM

## 2025-01-15 RX ORDER — TAMSULOSIN HYDROCHLORIDE 0.4 MG/1
0.8 CAPSULE ORAL DAILY
Status: DISCONTINUED | OUTPATIENT
Start: 2025-01-16 | End: 2025-01-20 | Stop reason: HOSPADM

## 2025-01-15 RX ORDER — NORTRIPTYLINE HYDROCHLORIDE 25 MG/1
25 CAPSULE ORAL NIGHTLY
Status: DISCONTINUED | OUTPATIENT
Start: 2025-01-15 | End: 2025-01-20 | Stop reason: HOSPADM

## 2025-01-15 RX ORDER — ASPIRIN 81 MG/1
81 TABLET, CHEWABLE ORAL DAILY
Status: DISCONTINUED | OUTPATIENT
Start: 2025-01-16 | End: 2025-01-20 | Stop reason: HOSPADM

## 2025-01-15 RX ORDER — DOCUSATE SODIUM 100 MG/1
100 CAPSULE, LIQUID FILLED ORAL 2 TIMES DAILY
Status: DISCONTINUED | OUTPATIENT
Start: 2025-01-15 | End: 2025-01-20 | Stop reason: HOSPADM

## 2025-01-15 RX ORDER — OXYCODONE HYDROCHLORIDE 5 MG/1
5 TABLET ORAL EVERY 4 HOURS PRN
Status: DISCONTINUED | OUTPATIENT
Start: 2025-01-15 | End: 2025-01-20 | Stop reason: HOSPADM

## 2025-01-15 RX ORDER — BACLOFEN 10 MG/1
10 TABLET ORAL EVERY 8 HOURS
Status: DISCONTINUED | OUTPATIENT
Start: 2025-01-15 | End: 2025-01-20 | Stop reason: HOSPADM

## 2025-01-15 RX ORDER — LORAZEPAM 0.5 MG/1
0.5 TABLET ORAL EVERY 6 HOURS PRN
Status: DISCONTINUED | OUTPATIENT
Start: 2025-01-15 | End: 2025-01-20 | Stop reason: HOSPADM

## 2025-01-15 RX ORDER — POLYETHYLENE GLYCOL 3350 17 G/17G
17 POWDER, FOR SOLUTION ORAL DAILY
Status: DISCONTINUED | OUTPATIENT
Start: 2025-01-16 | End: 2025-01-20 | Stop reason: HOSPADM

## 2025-01-15 RX ORDER — SENNOSIDES A AND B 8.6 MG/1
1 TABLET, FILM COATED ORAL NIGHTLY
Status: DISCONTINUED | OUTPATIENT
Start: 2025-01-15 | End: 2025-01-20 | Stop reason: HOSPADM

## 2025-01-15 RX ORDER — BISACODYL 10 MG
10 SUPPOSITORY, RECTAL RECTAL DAILY PRN
Status: DISCONTINUED | OUTPATIENT
Start: 2025-01-15 | End: 2025-01-15 | Stop reason: SDUPTHER

## 2025-01-15 RX ORDER — METOPROLOL SUCCINATE 25 MG/1
25 TABLET, EXTENDED RELEASE ORAL DAILY
Status: DISCONTINUED | OUTPATIENT
Start: 2025-01-16 | End: 2025-01-20 | Stop reason: HOSPADM

## 2025-01-15 RX ORDER — VENLAFAXINE HYDROCHLORIDE 75 MG/1
75 CAPSULE, EXTENDED RELEASE ORAL
Status: DISCONTINUED | OUTPATIENT
Start: 2025-01-16 | End: 2025-01-20 | Stop reason: HOSPADM

## 2025-01-15 RX ORDER — LATANOPROST 50 UG/ML
1 SOLUTION/ DROPS OPHTHALMIC NIGHTLY
Status: DISCONTINUED | OUTPATIENT
Start: 2025-01-15 | End: 2025-01-15

## 2025-01-15 RX ORDER — SENNA AND DOCUSATE SODIUM 50; 8.6 MG/1; MG/1
1 TABLET, FILM COATED ORAL NIGHTLY PRN
Status: DISCONTINUED | OUTPATIENT
Start: 2025-01-15 | End: 2025-01-20 | Stop reason: HOSPADM

## 2025-01-15 RX ORDER — TIMOLOL MALEATE 5 MG/ML
1 SOLUTION/ DROPS OPHTHALMIC 2 TIMES DAILY
Status: DISCONTINUED | OUTPATIENT
Start: 2025-01-15 | End: 2025-01-15 | Stop reason: CLARIF

## 2025-01-15 RX ORDER — MORPHINE SULFATE 20 MG/ML
5 SOLUTION ORAL
Status: DISCONTINUED | OUTPATIENT
Start: 2025-01-15 | End: 2025-01-20 | Stop reason: HOSPADM

## 2025-01-15 RX ORDER — TIMOLOL MALEATE 5 MG/ML
1 SOLUTION/ DROPS OPHTHALMIC 2 TIMES DAILY
Status: DISCONTINUED | OUTPATIENT
Start: 2025-01-15 | End: 2025-01-20 | Stop reason: HOSPADM

## 2025-01-15 RX ORDER — HALOPERIDOL 2 MG/ML
1 SOLUTION ORAL EVERY 6 HOURS PRN
Status: DISCONTINUED | OUTPATIENT
Start: 2025-01-15 | End: 2025-01-20 | Stop reason: HOSPADM

## 2025-01-15 RX ORDER — SODIUM PHOSPHATE, DIBASIC AND SODIUM PHOSPHATE, MONOBASIC 7; 19 G/230ML; G/230ML
1 ENEMA RECTAL DAILY PRN
Status: DISCONTINUED | OUTPATIENT
Start: 2025-01-15 | End: 2025-01-20 | Stop reason: HOSPADM

## 2025-01-15 RX ORDER — CARBAMAZEPINE 200 MG/1
200 TABLET ORAL 4 TIMES DAILY
Status: DISCONTINUED | OUTPATIENT
Start: 2025-01-15 | End: 2025-01-20 | Stop reason: HOSPADM

## 2025-01-15 RX ORDER — ACETAMINOPHEN 500 MG
1000 TABLET ORAL EVERY 8 HOURS PRN
Status: DISCONTINUED | OUTPATIENT
Start: 2025-01-15 | End: 2025-01-20 | Stop reason: HOSPADM

## 2025-01-15 RX ORDER — TAMSULOSIN HYDROCHLORIDE 0.4 MG/1
0.4 CAPSULE ORAL DAILY
Status: DISCONTINUED | OUTPATIENT
Start: 2025-01-16 | End: 2025-01-15 | Stop reason: CLARIF

## 2025-01-15 RX ADMIN — BACLOFEN 10 MG: 10 TABLET ORAL at 20:40

## 2025-01-15 RX ADMIN — MELOXICAM 7.5 MG: 7.5 TABLET ORAL at 20:40

## 2025-01-15 RX ADMIN — LATANOPROST 1 DROP: 50 SOLUTION/ DROPS OPHTHALMIC at 22:05

## 2025-01-15 RX ADMIN — TIMOLOL MALEATE 1 DROP: 5 SOLUTION/ DROPS OPHTHALMIC at 22:05

## 2025-01-15 RX ADMIN — NORTRIPTYLINE HYDROCHLORIDE 25 MG: 25 CAPSULE ORAL at 20:40

## 2025-01-15 RX ADMIN — CARBAMAZEPINE 200 MG: 200 TABLET ORAL at 17:34

## 2025-01-15 RX ADMIN — SENNOSIDES 8.6 MG: 8.6 TABLET, FILM COATED ORAL at 20:40

## 2025-01-15 RX ADMIN — CARBAMAZEPINE 200 MG: 200 TABLET ORAL at 20:40

## 2025-01-15 RX ADMIN — Medication 5 MG: at 15:35

## 2025-01-15 RX ADMIN — LACTULOSE 10 G: 20 SOLUTION ORAL at 20:41

## 2025-01-15 RX ADMIN — DOCUSATE SODIUM 100 MG: 100 CAPSULE, LIQUID FILLED ORAL at 20:40

## 2025-01-15 NOTE — PROGRESS NOTES
Patient arrived via [facility] to room 5. Patient awake and alert, no complaints at this time.

## 2025-01-15 NOTE — PROGRESS NOTES
Francisco Javier Maher arrived to the [facility] and was admitted under Respite LOC in [address].  His wife accompanied him but has left the [facility] when I went to room to visit.  Francisco Javier is in good spirits and adjusting well.  He voices no unmet  needs.  He will discharge back home on Monday.  His EMS DNR did not arrive to [facility].and [facility] did not have it.  I called Carol and she is going to bring in a copy sometime during his stay.  She verified plan to take him home on Monday via non-emergent EMS.

## 2025-01-16 PROCEDURE — 6370000000 HC RX 637 (ALT 250 FOR IP): Performed by: INTERNAL MEDICINE

## 2025-01-16 PROCEDURE — 6550000002 HC HOSPICE INPATIENT RESPITE

## 2025-01-16 RX ADMIN — FINASTERIDE 5 MG: 5 TABLET, FILM COATED ORAL at 08:02

## 2025-01-16 RX ADMIN — CARBAMAZEPINE 200 MG: 200 TABLET ORAL at 20:01

## 2025-01-16 RX ADMIN — TIMOLOL MALEATE 1 DROP: 5 SOLUTION/ DROPS OPHTHALMIC at 08:01

## 2025-01-16 RX ADMIN — DOCUSATE SODIUM 100 MG: 100 CAPSULE, LIQUID FILLED ORAL at 08:02

## 2025-01-16 RX ADMIN — MELOXICAM 7.5 MG: 7.5 TABLET ORAL at 20:33

## 2025-01-16 RX ADMIN — SENNOSIDES 8.6 MG: 8.6 TABLET, FILM COATED ORAL at 20:33

## 2025-01-16 RX ADMIN — BACLOFEN 10 MG: 10 TABLET ORAL at 03:39

## 2025-01-16 RX ADMIN — ASPIRIN 81 MG: 81 TABLET, CHEWABLE ORAL at 08:01

## 2025-01-16 RX ADMIN — METOPROLOL SUCCINATE 25 MG: 25 TABLET, EXTENDED RELEASE ORAL at 08:02

## 2025-01-16 RX ADMIN — LATANOPROST 1 DROP: 50 SOLUTION/ DROPS OPHTHALMIC at 20:33

## 2025-01-16 RX ADMIN — TIMOLOL MALEATE 1 DROP: 5 SOLUTION/ DROPS OPHTHALMIC at 19:56

## 2025-01-16 RX ADMIN — TAMSULOSIN HYDROCHLORIDE 0.8 MG: 0.4 CAPSULE ORAL at 08:02

## 2025-01-16 RX ADMIN — BACLOFEN 10 MG: 10 TABLET ORAL at 13:03

## 2025-01-16 RX ADMIN — NORTRIPTYLINE HYDROCHLORIDE 25 MG: 25 CAPSULE ORAL at 20:33

## 2025-01-16 RX ADMIN — PANTOPRAZOLE SODIUM 40 MG: 40 TABLET, DELAYED RELEASE ORAL at 07:59

## 2025-01-16 RX ADMIN — MELOXICAM 7.5 MG: 7.5 TABLET ORAL at 08:01

## 2025-01-16 RX ADMIN — LACTULOSE 10 G: 20 SOLUTION ORAL at 08:00

## 2025-01-16 RX ADMIN — CARBAMAZEPINE 200 MG: 200 TABLET ORAL at 13:03

## 2025-01-16 RX ADMIN — BACLOFEN 10 MG: 10 TABLET ORAL at 20:02

## 2025-01-16 RX ADMIN — VENLAFAXINE HYDROCHLORIDE 75 MG: 75 CAPSULE, EXTENDED RELEASE ORAL at 08:01

## 2025-01-16 RX ADMIN — LACTULOSE 10 G: 20 SOLUTION ORAL at 19:56

## 2025-01-16 RX ADMIN — AMLODIPINE BESYLATE 2.5 MG: 5 TABLET ORAL at 08:02

## 2025-01-16 RX ADMIN — OXYCODONE 5 MG: 5 TABLET ORAL at 03:40

## 2025-01-16 RX ADMIN — LORAZEPAM 0.5 MG: 0.5 TABLET ORAL at 03:40

## 2025-01-16 RX ADMIN — DOCUSATE SODIUM 100 MG: 100 CAPSULE, LIQUID FILLED ORAL at 20:00

## 2025-01-16 RX ADMIN — CARBAMAZEPINE 200 MG: 200 TABLET ORAL at 08:01

## 2025-01-16 RX ADMIN — POLYETHYLENE GLYCOL 3350 17 G: 17 POWDER, FOR SOLUTION ORAL at 08:00

## 2025-01-16 ASSESSMENT — PAIN DESCRIPTION - LOCATION: LOCATION: GENERALIZED

## 2025-01-16 NOTE — PROGRESS NOTES
Home: Mag oxide 800mg BID  Here: Mag oxide 400mg BID  7/28 Mag 1.9  Goals > 2. Pt initially refused all his medication stating he had already taken them this morning. SN attempted to reorient, but to no avail. SN called pt's spouse Carol and informed her that patient was experiencing increased confusion this morning and refused his medications. SN gave patient a few moments and he was agreeable to take his morning medications. Pt repositioned for comfort. No further needs at this time.

## 2025-01-16 NOTE — PROGRESS NOTES
2040 Scheduled medications taken whole a few at a time and tolerated well, fentanyl 12mcg applied to right upper arm and covered with tegraderm. Patient oriented to name otherwise confused. Bed alarm attached to  gown. SR up x 3, call light within reach.

## 2025-01-16 NOTE — PROGRESS NOTES
Bedside rounds and brief wet with urine, patient asked to use urinal but was already wet before urinal could be put in place. Patient cleaned and new brief in place. Patient became nauseated with the turns while brief was being changed, cool rag applied to forehead and neck and after 5 minutes patient states he felt much better and no antiemetic needed at this time.

## 2025-01-16 NOTE — PROGRESS NOTES
Patient  awake on bedside rounds, cover off but did not want it back on, states I am not cold. Denies pain or any complaints. Offered water but patient refuses.

## 2025-01-16 NOTE — PROGRESS NOTES
Bedside rounds and patient incontinent, cleaned and new brief in place. Turned and repositioned to left side with assist per [facility] PCA and SN, patient moaning, pillows used for support and to float heels off bed. Scheduled baclofen given po along with oxycodone 5mg po for pain and ativan 0.5mg po.    0410 PRN follow up  Effective, resting quietly with eyes closed.

## 2025-01-16 NOTE — PROGRESS NOTES
Pt reports pain improved after prn morphine administration, no further c/o pain at this time. No needs voiced at this time. Bed in lowest position, side rails x 2 up, call light within reach.

## 2025-01-16 NOTE — PROGRESS NOTES
Pt report and rounding with off going shift staff.  Pt is lying in bed, eyes closed. No signs/symptoms of pain/discomfort at this time, no needs. Pt's bed in lowest position, locked with side rails raised x 3, call light within reach.

## 2025-01-16 NOTE — PROGRESS NOTES
Francisco Javier Maher remains respite LOC in [address] at the [facility].  His wife Carol is present.  She tells me she brought the EMS DNR to the [facility] and gave to nurses.    He has had some more confusion today, which Carol states is typical of him.  He has not eaten much and Carol tells me that this is also typical and that he prefers soft foods.  Destiney QUINTANILLA has already requested a soft diet.  She goes \"Cookie\".  He will discharge home on Monday.    No new needs noted.

## 2025-01-16 NOTE — PROGRESS NOTES
Dr. Will on unit for rounds. Pt has no needs at this time. Pt remains appropriate for respite patient at this time.

## 2025-01-16 NOTE — PROGRESS NOTES
Pt lying in bed, wife at bedside. Pt repositioned for comfort. No needs at this time per spouse. Bed in lowest position, side rails x 3 and call light within reach.

## 2025-01-17 PROCEDURE — 6550000002 HC HOSPICE INPATIENT RESPITE

## 2025-01-17 PROCEDURE — 6370000000 HC RX 637 (ALT 250 FOR IP): Performed by: INTERNAL MEDICINE

## 2025-01-17 RX ADMIN — PANTOPRAZOLE SODIUM 40 MG: 40 TABLET, DELAYED RELEASE ORAL at 06:31

## 2025-01-17 RX ADMIN — CARBAMAZEPINE 200 MG: 200 TABLET ORAL at 20:50

## 2025-01-17 RX ADMIN — MELOXICAM 7.5 MG: 7.5 TABLET ORAL at 08:10

## 2025-01-17 RX ADMIN — POLYETHYLENE GLYCOL 3350 17 G: 17 POWDER, FOR SOLUTION ORAL at 08:11

## 2025-01-17 RX ADMIN — CARBAMAZEPINE 200 MG: 200 TABLET ORAL at 12:31

## 2025-01-17 RX ADMIN — Medication 5 MG: at 06:30

## 2025-01-17 RX ADMIN — CARBAMAZEPINE 200 MG: 200 TABLET ORAL at 17:24

## 2025-01-17 RX ADMIN — LATANOPROST 1 DROP: 50 SOLUTION/ DROPS OPHTHALMIC at 20:50

## 2025-01-17 RX ADMIN — Medication 5 MG: at 12:41

## 2025-01-17 RX ADMIN — NORTRIPTYLINE HYDROCHLORIDE 25 MG: 25 CAPSULE ORAL at 20:50

## 2025-01-17 RX ADMIN — SENNOSIDES 8.6 MG: 8.6 TABLET, FILM COATED ORAL at 20:50

## 2025-01-17 RX ADMIN — ASPIRIN 81 MG: 81 TABLET, CHEWABLE ORAL at 08:11

## 2025-01-17 RX ADMIN — BACLOFEN 10 MG: 10 TABLET ORAL at 03:45

## 2025-01-17 RX ADMIN — LACTULOSE 10 G: 20 SOLUTION ORAL at 08:11

## 2025-01-17 RX ADMIN — OXYCODONE 5 MG: 5 TABLET ORAL at 03:45

## 2025-01-17 RX ADMIN — DOCUSATE SODIUM 100 MG: 100 CAPSULE, LIQUID FILLED ORAL at 20:50

## 2025-01-17 RX ADMIN — TIMOLOL MALEATE 1 DROP: 5 SOLUTION/ DROPS OPHTHALMIC at 08:15

## 2025-01-17 RX ADMIN — TIMOLOL MALEATE 1 DROP: 5 SOLUTION/ DROPS OPHTHALMIC at 20:50

## 2025-01-17 RX ADMIN — METOPROLOL SUCCINATE 25 MG: 25 TABLET, EXTENDED RELEASE ORAL at 08:10

## 2025-01-17 RX ADMIN — TAMSULOSIN HYDROCHLORIDE 0.8 MG: 0.4 CAPSULE ORAL at 08:10

## 2025-01-17 RX ADMIN — VENLAFAXINE HYDROCHLORIDE 75 MG: 75 CAPSULE, EXTENDED RELEASE ORAL at 06:31

## 2025-01-17 RX ADMIN — DOCUSATE SODIUM 100 MG: 100 CAPSULE, LIQUID FILLED ORAL at 08:11

## 2025-01-17 RX ADMIN — CARBAMAZEPINE 200 MG: 200 TABLET ORAL at 08:10

## 2025-01-17 RX ADMIN — MELOXICAM 7.5 MG: 7.5 TABLET ORAL at 20:50

## 2025-01-17 RX ADMIN — BACLOFEN 10 MG: 10 TABLET ORAL at 12:31

## 2025-01-17 RX ADMIN — AMLODIPINE BESYLATE 2.5 MG: 5 TABLET ORAL at 08:11

## 2025-01-17 RX ADMIN — LACTULOSE 10 G: 20 SOLUTION ORAL at 20:50

## 2025-01-17 RX ADMIN — BACLOFEN 10 MG: 10 TABLET ORAL at 20:50

## 2025-01-17 RX ADMIN — LORAZEPAM 0.5 MG: 0.5 TABLET ORAL at 12:41

## 2025-01-17 RX ADMIN — FINASTERIDE 5 MG: 5 TABLET, FILM COATED ORAL at 08:11

## 2025-01-17 ASSESSMENT — PAIN DESCRIPTION - LOCATION
LOCATION: GENERALIZED
LOCATION: GENERALIZED

## 2025-01-17 NOTE — PROGRESS NOTES
Mr. Francisco Javier Maher remains respite LOC in [address] at the [facility].  He is awake in his bed but is pleasantly confused.  He voices  no discomfort. Visited for a bit and provided support.  Discharge anticipated for Monday via non-emergent EMS.

## 2025-01-17 NOTE — PROGRESS NOTES
FOLLOW UP TO MAREN:   He still has a furrow to brow, but he is settling down after being turned and moved.  Pain per flacc is 3/10. Will continue to monitor.

## 2025-01-17 NOTE — PROGRESS NOTES
Patient was wet, and needed to be turned; became so stiff, he was very difficult to move.  The turning and changing him caused him intense pain, which he didn't show to this extent the last time he was changed and turned.  After being turned, Roxanol 5 mg given along with his morning medications.  Will monitor for effectiveness, but so far still showing discomfort.  Pain per flacc is 7/10.

## 2025-01-17 NOTE — PROGRESS NOTES
Confused; will answer some questions, but keeps asking where he is and sometimes immediately will ask again.  Took medication without difficulty.  Slightly flat affect.  Pain per flacc is 0/10.

## 2025-01-17 NOTE — PROGRESS NOTES
Dr. Will is in the unit for rounds. Pt has no needs at this time. No orders received. Pt remains appropriate for GIP level of care.

## 2025-01-17 NOTE — PROGRESS NOTES
Patient is awake.  Verbal.  Gets crooked in the bed.  He is pleasant, but often responses aren't totally appropriate.  He denies pain, but grimaces when pulled straight in the bed.  Pain per flacc is 5/10.  He has scheduled baclofen at this time and also hydrocodone 5 mg po given at this time.  Swallowed without difficulty. Will monitor for effectiveness.

## 2025-01-17 NOTE — PROGRESS NOTES
Pt report and rounding with off going shift staff. Pt is lying in bed, eyes closed, No signs/symptoms of pain/discomfort at this time, no needs. Pt's bed in lowest position, locked with side rails raised x 3, call light within reach

## 2025-01-17 NOTE — PROGRESS NOTES
Pt resting in bed, eyes closed. Pt repositioned for comfort. Pt only ate 3 bites of his supper tray with assistance of PCA. No further needs at this time. Bed in lowest position, side rails x 3, call light within reach.

## 2025-01-17 NOTE — PROGRESS NOTES
1241-Pt in bed, grabbing arm of staff as we are moving him up in bed to feed him. He is visibly agitated and when asked he states he is hurting as well.  SN gave Prn dose of ativan and roxanol to combat agitation and pain. Pt refused to eat at this time, will offer a snack later once he feels better. Bed in lowest position, side rails x 3, call light within reach. No further needs at this time.   1310-Pt is lying in bed, eyes closed, resting quietly at this time. No signs/symptoms of pain or discomfort. No further needs noted at this time.  Bed in lowest position, side rails x 3, call light within reach.

## 2025-01-17 NOTE — PROGRESS NOTES
The pt is lying in bed.  He did not open his eyes but did answer when his name was called. He appears comfortable . No needs identified.  Prayer support provided.  Will follow.

## 2025-01-17 NOTE — PROGRESS NOTES
Pt is lying in bed, eyes closed, with spouse Carol at his bedside. SN updated spouse about his morning and how well he ate breakfast. Spouse was surprised to learn he has all his breakfast and that he enjoyed the cheerios. Spouse voiced he hasn't ever liked cereal. Active listening provided. Emotional support provided. All questions answered. Bed in lowest position, side rails x 3, call light within reach.

## 2025-01-18 PROCEDURE — 6550000002 HC HOSPICE INPATIENT RESPITE

## 2025-01-18 PROCEDURE — 6370000000 HC RX 637 (ALT 250 FOR IP): Performed by: INTERNAL MEDICINE

## 2025-01-18 RX ADMIN — CARBAMAZEPINE 200 MG: 200 TABLET ORAL at 19:54

## 2025-01-18 RX ADMIN — LATANOPROST 1 DROP: 50 SOLUTION/ DROPS OPHTHALMIC at 19:55

## 2025-01-18 RX ADMIN — LORAZEPAM 0.5 MG: 0.5 TABLET ORAL at 08:05

## 2025-01-18 RX ADMIN — TIMOLOL MALEATE 1 DROP: 5 SOLUTION/ DROPS OPHTHALMIC at 19:55

## 2025-01-18 RX ADMIN — CARBAMAZEPINE 200 MG: 200 TABLET ORAL at 08:04

## 2025-01-18 RX ADMIN — Medication 5 MG: at 11:54

## 2025-01-18 RX ADMIN — BACLOFEN 10 MG: 10 TABLET ORAL at 03:08

## 2025-01-18 RX ADMIN — TAMSULOSIN HYDROCHLORIDE 0.8 MG: 0.4 CAPSULE ORAL at 08:04

## 2025-01-18 RX ADMIN — VENLAFAXINE HYDROCHLORIDE 75 MG: 75 CAPSULE, EXTENDED RELEASE ORAL at 08:05

## 2025-01-18 RX ADMIN — SODIUM PHOSPHATE, DIBASIC AND SODIUM PHOSPHATE, MONOBASIC 1 ENEMA: 7; 19 ENEMA RECTAL at 20:45

## 2025-01-18 RX ADMIN — DOCUSATE SODIUM 100 MG: 100 CAPSULE, LIQUID FILLED ORAL at 19:54

## 2025-01-18 RX ADMIN — FINASTERIDE 5 MG: 5 TABLET, FILM COATED ORAL at 08:04

## 2025-01-18 RX ADMIN — METOPROLOL SUCCINATE 25 MG: 25 TABLET, EXTENDED RELEASE ORAL at 08:04

## 2025-01-18 RX ADMIN — MELOXICAM 7.5 MG: 7.5 TABLET ORAL at 08:04

## 2025-01-18 RX ADMIN — OXYCODONE 5 MG: 5 TABLET ORAL at 08:03

## 2025-01-18 RX ADMIN — BACLOFEN 10 MG: 10 TABLET ORAL at 19:54

## 2025-01-18 RX ADMIN — LACTULOSE 10 G: 20 SOLUTION ORAL at 19:54

## 2025-01-18 RX ADMIN — BACLOFEN 10 MG: 10 TABLET ORAL at 11:54

## 2025-01-18 RX ADMIN — PANTOPRAZOLE SODIUM 40 MG: 40 TABLET, DELAYED RELEASE ORAL at 06:19

## 2025-01-18 RX ADMIN — MELOXICAM 7.5 MG: 7.5 TABLET ORAL at 19:54

## 2025-01-18 RX ADMIN — SENNOSIDES 8.6 MG: 8.6 TABLET, FILM COATED ORAL at 19:54

## 2025-01-18 RX ADMIN — POLYETHYLENE GLYCOL 3350 17 G: 17 POWDER, FOR SOLUTION ORAL at 08:05

## 2025-01-18 RX ADMIN — LACTULOSE 10 G: 20 SOLUTION ORAL at 08:05

## 2025-01-18 RX ADMIN — DOCUSATE SODIUM 100 MG: 100 CAPSULE, LIQUID FILLED ORAL at 08:04

## 2025-01-18 RX ADMIN — NORTRIPTYLINE HYDROCHLORIDE 25 MG: 25 CAPSULE ORAL at 19:54

## 2025-01-18 RX ADMIN — ASPIRIN 81 MG: 81 TABLET, CHEWABLE ORAL at 08:05

## 2025-01-18 RX ADMIN — AMLODIPINE BESYLATE 2.5 MG: 5 TABLET ORAL at 08:03

## 2025-01-18 RX ADMIN — TIMOLOL MALEATE 1 DROP: 5 SOLUTION/ DROPS OPHTHALMIC at 08:57

## 2025-01-18 NOTE — PROGRESS NOTES
Pt lying in bed resting well, no signs/symptoms of pain or discomfort noted. Pt ate a decent supper this evening and was in good spirits when eating. No needs at this time. Bed locked in lowest position, call light within reach, side rails x 3 up.

## 2025-01-18 NOTE — PROGRESS NOTES
Change of shift report and rounds. Patient resting in bed quietly with eyes closed. Did not respond to nursing.Bed in lowest position and locked. Call bell within reach. Upper rails up x 2.

## 2025-01-18 NOTE — PROGRESS NOTES
Rounded with nightshift nurses Sophy. Patient is awake and stated he is in pain. Safety measures in place. No family at bedside.

## 2025-01-18 NOTE — PROGRESS NOTES
Scheduled medications given one at a time. Attempted to give with water but patient couldn't us straw even with prompting. When spoon used with water he coughed. Medications ultimately given with pudding. Patient needed repeated reminders to swallow. Eye drops used without difficulty

## 2025-01-19 PROCEDURE — 6550000002 HC HOSPICE INPATIENT RESPITE

## 2025-01-19 PROCEDURE — 6370000000 HC RX 637 (ALT 250 FOR IP): Performed by: INTERNAL MEDICINE

## 2025-01-19 RX ORDER — FENTANYL 25 UG/1
1 PATCH TRANSDERMAL
Status: DISCONTINUED | OUTPATIENT
Start: 2025-01-19 | End: 2025-01-19

## 2025-01-19 RX ORDER — FENTANYL 12.5 UG/1
1 PATCH TRANSDERMAL
Status: DISCONTINUED | OUTPATIENT
Start: 2025-01-21 | End: 2025-01-20 | Stop reason: HOSPADM

## 2025-01-19 RX ADMIN — MELOXICAM 7.5 MG: 7.5 TABLET ORAL at 08:32

## 2025-01-19 RX ADMIN — CARBAMAZEPINE 200 MG: 200 TABLET ORAL at 20:38

## 2025-01-19 RX ADMIN — OXYCODONE 5 MG: 5 TABLET ORAL at 08:31

## 2025-01-19 RX ADMIN — FINASTERIDE 5 MG: 5 TABLET, FILM COATED ORAL at 08:31

## 2025-01-19 RX ADMIN — AMLODIPINE BESYLATE 2.5 MG: 5 TABLET ORAL at 08:31

## 2025-01-19 RX ADMIN — OXYCODONE 5 MG: 5 TABLET ORAL at 20:37

## 2025-01-19 RX ADMIN — BACLOFEN 10 MG: 10 TABLET ORAL at 20:38

## 2025-01-19 RX ADMIN — MELOXICAM 7.5 MG: 7.5 TABLET ORAL at 20:39

## 2025-01-19 RX ADMIN — Medication 5 MG: at 12:24

## 2025-01-19 RX ADMIN — NORTRIPTYLINE HYDROCHLORIDE 25 MG: 25 CAPSULE ORAL at 20:39

## 2025-01-19 RX ADMIN — CARBAMAZEPINE 200 MG: 200 TABLET ORAL at 12:25

## 2025-01-19 RX ADMIN — TAMSULOSIN HYDROCHLORIDE 0.8 MG: 0.4 CAPSULE ORAL at 08:30

## 2025-01-19 RX ADMIN — LORAZEPAM 0.5 MG: 0.5 TABLET ORAL at 04:37

## 2025-01-19 RX ADMIN — POLYETHYLENE GLYCOL 3350 17 G: 17 POWDER, FOR SOLUTION ORAL at 08:33

## 2025-01-19 RX ADMIN — TIMOLOL MALEATE 1 DROP: 5 SOLUTION/ DROPS OPHTHALMIC at 10:01

## 2025-01-19 RX ADMIN — METOPROLOL SUCCINATE 25 MG: 25 TABLET, EXTENDED RELEASE ORAL at 08:32

## 2025-01-19 RX ADMIN — LACTULOSE 10 G: 20 SOLUTION ORAL at 20:36

## 2025-01-19 RX ADMIN — OXYCODONE 5 MG: 5 TABLET ORAL at 04:37

## 2025-01-19 RX ADMIN — ASPIRIN 81 MG: 81 TABLET, CHEWABLE ORAL at 08:32

## 2025-01-19 RX ADMIN — VENLAFAXINE HYDROCHLORIDE 75 MG: 75 CAPSULE, EXTENDED RELEASE ORAL at 08:32

## 2025-01-19 RX ADMIN — SENNOSIDES 8.6 MG: 8.6 TABLET, FILM COATED ORAL at 20:39

## 2025-01-19 RX ADMIN — CARBAMAZEPINE 200 MG: 200 TABLET ORAL at 08:30

## 2025-01-19 RX ADMIN — LATANOPROST 1 DROP: 50 SOLUTION/ DROPS OPHTHALMIC at 20:39

## 2025-01-19 RX ADMIN — DOCUSATE SODIUM 100 MG: 100 CAPSULE, LIQUID FILLED ORAL at 20:38

## 2025-01-19 RX ADMIN — LORAZEPAM 0.5 MG: 0.5 TABLET ORAL at 20:38

## 2025-01-19 RX ADMIN — PANTOPRAZOLE SODIUM 40 MG: 40 TABLET, DELAYED RELEASE ORAL at 08:32

## 2025-01-19 RX ADMIN — TIMOLOL MALEATE 1 DROP: 5 SOLUTION/ DROPS OPHTHALMIC at 20:39

## 2025-01-19 RX ADMIN — LACTULOSE 10 G: 20 SOLUTION ORAL at 08:33

## 2025-01-19 RX ADMIN — BACLOFEN 10 MG: 10 TABLET ORAL at 12:24

## 2025-01-19 RX ADMIN — CARBAMAZEPINE 200 MG: 200 TABLET ORAL at 17:10

## 2025-01-19 RX ADMIN — BACLOFEN 10 MG: 10 TABLET ORAL at 03:11

## 2025-01-19 RX ADMIN — DOCUSATE SODIUM 100 MG: 100 CAPSULE, LIQUID FILLED ORAL at 08:30

## 2025-01-19 NOTE — PROGRESS NOTES
Patient screaming \" kill me\" while changing brief. When asked if he was in pain he said yes but couldn't verbalize where. PRN oxycodone and PRN lorazepam given.

## 2025-01-19 NOTE — PROGRESS NOTES
Change of shift report and rounds. Patient in bed with eyes open. Somewhat confused. Bed in lowest position and locked. Call bell within reach. Upper rails up x 2.

## 2025-01-19 NOTE — PROGRESS NOTES
Rounded with night shift nurses, Karla. Patient is awake and listing to hi rt side. Safety measures in place.

## 2025-01-20 VITALS
SYSTOLIC BLOOD PRESSURE: 134 MMHG | TEMPERATURE: 97.2 F | DIASTOLIC BLOOD PRESSURE: 68 MMHG | OXYGEN SATURATION: 96 % | RESPIRATION RATE: 16 BRPM | HEART RATE: 76 BPM

## 2025-01-20 PROCEDURE — 6370000000 HC RX 637 (ALT 250 FOR IP): Performed by: INTERNAL MEDICINE

## 2025-01-20 RX ADMIN — TIMOLOL MALEATE 1 DROP: 5 SOLUTION/ DROPS OPHTHALMIC at 08:26

## 2025-01-20 RX ADMIN — BACLOFEN 10 MG: 10 TABLET ORAL at 12:36

## 2025-01-20 RX ADMIN — TAMSULOSIN HYDROCHLORIDE 0.8 MG: 0.4 CAPSULE ORAL at 08:20

## 2025-01-20 RX ADMIN — ASPIRIN 81 MG: 81 TABLET, CHEWABLE ORAL at 08:20

## 2025-01-20 RX ADMIN — METOPROLOL SUCCINATE 25 MG: 25 TABLET, EXTENDED RELEASE ORAL at 08:20

## 2025-01-20 RX ADMIN — VENLAFAXINE HYDROCHLORIDE 75 MG: 75 CAPSULE, EXTENDED RELEASE ORAL at 08:20

## 2025-01-20 RX ADMIN — CARBAMAZEPINE 200 MG: 200 TABLET ORAL at 08:21

## 2025-01-20 RX ADMIN — AMLODIPINE BESYLATE 2.5 MG: 5 TABLET ORAL at 08:20

## 2025-01-20 RX ADMIN — POLYETHYLENE GLYCOL 3350 17 G: 17 POWDER, FOR SOLUTION ORAL at 08:21

## 2025-01-20 RX ADMIN — CARBAMAZEPINE 200 MG: 200 TABLET ORAL at 12:36

## 2025-01-20 RX ADMIN — BACLOFEN 10 MG: 10 TABLET ORAL at 03:17

## 2025-01-20 RX ADMIN — MELOXICAM 7.5 MG: 7.5 TABLET ORAL at 08:21

## 2025-01-20 RX ADMIN — FINASTERIDE 5 MG: 5 TABLET, FILM COATED ORAL at 08:21

## 2025-01-20 RX ADMIN — DOCUSATE SODIUM 100 MG: 100 CAPSULE, LIQUID FILLED ORAL at 08:20

## 2025-01-20 RX ADMIN — LACTULOSE 10 G: 20 SOLUTION ORAL at 08:20

## 2025-01-20 RX ADMIN — PANTOPRAZOLE SODIUM 40 MG: 40 TABLET, DELAYED RELEASE ORAL at 08:21

## 2025-01-20 NOTE — PROGRESS NOTES
Turned and repositioned to right side using pillows for support and to float heels off bed, FLACC 0/10, bed in low position/locked and SR up x2, call light within reach.

## 2025-01-20 NOTE — PROGRESS NOTES
When SN asked patient if he hurt states yes all over. Patient a little agitated at this time. Scheduled medications given po along with oxycodone 5mg and ativan 0.5 mg po. Medications taken whole with applesauce and drank several drinks of water.    2110 PRN follow up   Effective, resting quietly with eyes closed.

## 2025-01-20 NOTE — PROGRESS NOTES
Bed bath given, noted smear of stool, SN disimpacted moderate amount hard dry stool, tolerated fair. New brief in place, floated on his back using a pillow to each side and heels elevated off bed using pillows for support.

## 2025-01-20 NOTE — PROGRESS NOTES
The pt is sitting up in bed.  He accepted the offer for prayer.  He states he feels some better today and is ready to dc home.  Prayer support provided.  Pt to dc today. [facility]  to follow.

## 2025-01-20 NOTE — PROGRESS NOTES
Francisco Javier Maher remains respite LOC in [address] at the [facility].  I spoke to his wife and she is requesting he be discharged this afternoon as she has some morning appointments.  I called [facility] to request Non-emergent EMS for 2pm today.  I notified Carol of this.  EMS DNR is on file at the nurses' station.  I notified [facility] and will fax them his med list.

## 2025-01-20 NOTE — PROGRESS NOTES
Bedside rounds and patient awake, scheduled baclofen given as ordered, patient needed lot of encouragement to swallow pill but finally got it down. Vital signs taken T97.4 P76 R16 B/P134/68. Brief dry at this time.

## 2025-01-20 NOTE — PROGRESS NOTES
Dr. Will on unit for rounds. Pt has no needs at this time. No orders received. Pt is heading home today from his planned five day respite stay. No needs identified.

## 2025-01-20 NOTE — PROGRESS NOTES
[facility] here for patient to be transported home. Pt tolerated transfer to EMS stretcher well. Pt has his t-shirt on as requested and warm blanket on him after transferred to EMS stretcher.

## 2025-01-20 NOTE — PROGRESS NOTES
Pt lying in bed, he is awake and appears to be looking out the window. No signs/symptoms of pain or discomfort. Pt ate his entire bowl of potatoe soup, jello and most of his vanilla pudding. Pt voiced he was full. Pt repositioned for comfort. No further needs at this time. EMS transport set up per Margy , wife notified per Margy as well. Wife came to facility to  his eye drops she supplied to [facility] and his personal belongings. She left a t-shirt she wants on him for the ride home and left a nice warm blanket for him as well.

## 2025-08-27 ENCOUNTER — HOSPITAL ENCOUNTER (INPATIENT)
Age: 75
LOS: 5 days | Discharge: HOME | DRG: 951 | End: 2025-09-01
Attending: INTERNAL MEDICINE | Admitting: INTERNAL MEDICINE
Payer: COMMERCIAL

## 2025-08-27 PROCEDURE — 6370000000 HC RX 637 (ALT 250 FOR IP): Performed by: INTERNAL MEDICINE

## 2025-08-27 PROCEDURE — 6560000002 HC HOSPICE GENERAL INPATIENT

## 2025-08-27 RX ORDER — MELOXICAM 7.5 MG/1
7.5 TABLET ORAL 2 TIMES DAILY
Status: DISCONTINUED | OUTPATIENT
Start: 2025-08-27 | End: 2025-09-01 | Stop reason: HOSPADM

## 2025-08-27 RX ORDER — LEVETIRACETAM 500 MG/1
1000 TABLET ORAL 2 TIMES DAILY
Status: DISCONTINUED | OUTPATIENT
Start: 2025-08-27 | End: 2025-09-01 | Stop reason: HOSPADM

## 2025-08-27 RX ORDER — POLYETHYLENE GLYCOL 3350 17 G/17G
17 POWDER, FOR SOLUTION ORAL DAILY
Status: DISCONTINUED | OUTPATIENT
Start: 2025-08-27 | End: 2025-08-27

## 2025-08-27 RX ORDER — NORTRIPTYLINE HYDROCHLORIDE 25 MG/1
25 CAPSULE ORAL NIGHTLY
Status: DISCONTINUED | OUTPATIENT
Start: 2025-08-27 | End: 2025-09-01 | Stop reason: HOSPADM

## 2025-08-27 RX ORDER — FINASTERIDE 5 MG/1
5 TABLET, FILM COATED ORAL DAILY
Status: DISCONTINUED | OUTPATIENT
Start: 2025-08-27 | End: 2025-09-01 | Stop reason: HOSPADM

## 2025-08-27 RX ORDER — TAMSULOSIN HYDROCHLORIDE 0.4 MG/1
0.8 CAPSULE ORAL DAILY
Status: DISCONTINUED | OUTPATIENT
Start: 2025-08-27 | End: 2025-09-01 | Stop reason: HOSPADM

## 2025-08-27 RX ORDER — BISACODYL 10 MG
10 SUPPOSITORY, RECTAL RECTAL DAILY PRN
Status: DISCONTINUED | OUTPATIENT
Start: 2025-08-27 | End: 2025-09-01 | Stop reason: HOSPADM

## 2025-08-27 RX ORDER — HYOSCYAMINE SULFATE 0.12 MG/1
0.12 TABLET SUBLINGUAL EVERY 4 HOURS PRN
Status: DISCONTINUED | OUTPATIENT
Start: 2025-08-27 | End: 2025-09-01 | Stop reason: HOSPADM

## 2025-08-27 RX ORDER — LORAZEPAM 0.5 MG/1
0.5 TABLET ORAL EVERY 6 HOURS PRN
Status: DISCONTINUED | OUTPATIENT
Start: 2025-08-27 | End: 2025-09-01 | Stop reason: HOSPADM

## 2025-08-27 RX ORDER — MORPHINE SULFATE 20 MG/ML
5 SOLUTION ORAL
Refills: 0 | Status: DISCONTINUED | OUTPATIENT
Start: 2025-08-27 | End: 2025-09-01 | Stop reason: HOSPADM

## 2025-08-27 RX ORDER — BACLOFEN 10 MG/1
10 TABLET ORAL EVERY 8 HOURS
Status: DISCONTINUED | OUTPATIENT
Start: 2025-08-27 | End: 2025-09-01 | Stop reason: HOSPADM

## 2025-08-27 RX ORDER — MIDODRINE HYDROCHLORIDE 5 MG/1
5 TABLET ORAL 3 TIMES DAILY PRN
Status: DISCONTINUED | OUTPATIENT
Start: 2025-08-27 | End: 2025-09-01 | Stop reason: HOSPADM

## 2025-08-27 RX ORDER — VENLAFAXINE HYDROCHLORIDE 75 MG/1
75 CAPSULE, EXTENDED RELEASE ORAL
Status: DISCONTINUED | OUTPATIENT
Start: 2025-08-28 | End: 2025-09-01 | Stop reason: HOSPADM

## 2025-08-27 RX ORDER — DOCUSATE SODIUM 100 MG/1
100 CAPSULE, LIQUID FILLED ORAL 2 TIMES DAILY PRN
Status: DISCONTINUED | OUTPATIENT
Start: 2025-08-27 | End: 2025-09-01 | Stop reason: HOSPADM

## 2025-08-27 RX ORDER — FENTANYL 25 UG/1
1 PATCH TRANSDERMAL
Refills: 0 | Status: DISCONTINUED | OUTPATIENT
Start: 2025-08-29 | End: 2025-09-01 | Stop reason: HOSPADM

## 2025-08-27 RX ORDER — MIDODRINE HYDROCHLORIDE 5 MG/1
5 TABLET ORAL
Status: DISCONTINUED | OUTPATIENT
Start: 2025-08-27 | End: 2025-08-27

## 2025-08-27 RX ORDER — HALOPERIDOL 2 MG/ML
1 SOLUTION ORAL EVERY 6 HOURS PRN
Status: DISCONTINUED | OUTPATIENT
Start: 2025-08-27 | End: 2025-09-01 | Stop reason: HOSPADM

## 2025-08-27 RX ORDER — LATANOPROST 50 UG/ML
1 SOLUTION/ DROPS OPHTHALMIC NIGHTLY
Status: DISCONTINUED | OUTPATIENT
Start: 2025-08-27 | End: 2025-09-01 | Stop reason: HOSPADM

## 2025-08-27 RX ORDER — LACTULOSE 10 G/15ML
10 SOLUTION ORAL 2 TIMES DAILY PRN
Status: DISCONTINUED | OUTPATIENT
Start: 2025-08-27 | End: 2025-09-01 | Stop reason: HOSPADM

## 2025-08-27 RX ORDER — DOCUSATE SODIUM 100 MG/1
100 CAPSULE, LIQUID FILLED ORAL 2 TIMES DAILY
Status: DISCONTINUED | OUTPATIENT
Start: 2025-08-27 | End: 2025-08-27

## 2025-08-27 RX ORDER — CARBAMAZEPINE 200 MG/1
400 TABLET ORAL 2 TIMES DAILY
Status: DISCONTINUED | OUTPATIENT
Start: 2025-08-27 | End: 2025-09-01 | Stop reason: HOSPADM

## 2025-08-27 RX ORDER — ACETAMINOPHEN 500 MG
1000 TABLET ORAL EVERY 8 HOURS PRN
Status: DISCONTINUED | OUTPATIENT
Start: 2025-08-27 | End: 2025-09-01 | Stop reason: HOSPADM

## 2025-08-27 RX ORDER — POLYETHYLENE GLYCOL 3350 17 G/17G
17 POWDER, FOR SOLUTION ORAL DAILY PRN
Status: DISCONTINUED | OUTPATIENT
Start: 2025-08-27 | End: 2025-09-01 | Stop reason: HOSPADM

## 2025-08-27 RX ORDER — PANTOPRAZOLE SODIUM 40 MG/1
40 TABLET, DELAYED RELEASE ORAL
Status: DISCONTINUED | OUTPATIENT
Start: 2025-08-28 | End: 2025-09-01 | Stop reason: HOSPADM

## 2025-08-27 RX ORDER — BACLOFEN 10 MG/1
10 TABLET ORAL 3 TIMES DAILY
Status: DISCONTINUED | OUTPATIENT
Start: 2025-08-27 | End: 2025-08-27

## 2025-08-27 RX ORDER — PROCHLORPERAZINE MALEATE 10 MG
10 TABLET ORAL EVERY 6 HOURS PRN
Status: DISCONTINUED | OUTPATIENT
Start: 2025-08-27 | End: 2025-09-01 | Stop reason: HOSPADM

## 2025-08-27 RX ORDER — OXYCODONE HYDROCHLORIDE 5 MG/1
5 TABLET ORAL EVERY 4 HOURS PRN
Refills: 0 | Status: DISCONTINUED | OUTPATIENT
Start: 2025-08-27 | End: 2025-09-01 | Stop reason: HOSPADM

## 2025-08-27 RX ORDER — SODIUM PHOSPHATE, DIBASIC AND SODIUM PHOSPHATE, MONOBASIC 7; 19 G/230ML; G/230ML
1 ENEMA RECTAL DAILY PRN
Status: ACTIVE | OUTPATIENT
Start: 2025-08-27 | End: 2025-08-28

## 2025-08-27 RX ADMIN — FINASTERIDE 5 MG: 5 TABLET, FILM COATED ORAL at 20:42

## 2025-08-27 RX ADMIN — BACLOFEN 10 MG: 10 TABLET ORAL at 12:18

## 2025-08-27 RX ADMIN — TAMSULOSIN HYDROCHLORIDE 0.8 MG: 0.4 CAPSULE ORAL at 21:05

## 2025-08-27 RX ADMIN — NORTRIPTYLINE HYDROCHLORIDE 25 MG: 25 CAPSULE ORAL at 20:42

## 2025-08-27 RX ADMIN — BACLOFEN 10 MG: 10 TABLET ORAL at 22:03

## 2025-08-27 RX ADMIN — POLYETHYLENE GLYCOL 3350 17 G: 17 POWDER, FOR SOLUTION ORAL at 12:26

## 2025-08-27 RX ADMIN — LATANOPROST 1 DROP: 50 SOLUTION OPHTHALMIC at 20:42

## 2025-08-27 RX ADMIN — LEVETIRACETAM 1000 MG: 500 TABLET, FILM COATED ORAL at 20:44

## 2025-08-27 RX ADMIN — CARBAMAZEPINE 400 MG: 200 TABLET ORAL at 20:43

## 2025-08-27 RX ADMIN — CARBAMAZEPINE 400 MG: 200 TABLET ORAL at 13:38

## 2025-08-27 RX ADMIN — MELOXICAM 7.5 MG: 7.5 TABLET ORAL at 20:43

## 2025-08-27 RX ADMIN — MIDODRINE HYDROCHLORIDE 5 MG: 5 TABLET ORAL at 12:18

## 2025-08-27 ASSESSMENT — PAIN SCALES - GENERAL: PAINLEVEL_OUTOF10: 0

## 2025-08-27 ASSESSMENT — PAIN DESCRIPTION - PAIN TYPE: TYPE: CHRONIC PAIN

## 2025-08-28 PROCEDURE — 6560000002 HC HOSPICE GENERAL INPATIENT

## 2025-08-28 PROCEDURE — 6370000000 HC RX 637 (ALT 250 FOR IP): Performed by: INTERNAL MEDICINE

## 2025-08-28 RX ADMIN — BACLOFEN 10 MG: 10 TABLET ORAL at 14:53

## 2025-08-28 RX ADMIN — LEVETIRACETAM 1000 MG: 500 TABLET, FILM COATED ORAL at 08:40

## 2025-08-28 RX ADMIN — MELOXICAM 7.5 MG: 7.5 TABLET ORAL at 08:41

## 2025-08-28 RX ADMIN — PROCHLORPERAZINE MALEATE 10 MG: 10 TABLET ORAL at 12:23

## 2025-08-28 RX ADMIN — BACLOFEN 10 MG: 10 TABLET ORAL at 21:55

## 2025-08-28 RX ADMIN — CARBAMAZEPINE 400 MG: 200 TABLET ORAL at 21:55

## 2025-08-28 RX ADMIN — MELOXICAM 7.5 MG: 7.5 TABLET ORAL at 21:55

## 2025-08-28 RX ADMIN — LATANOPROST 1 DROP: 50 SOLUTION OPHTHALMIC at 21:54

## 2025-08-28 RX ADMIN — NORTRIPTYLINE HYDROCHLORIDE 25 MG: 25 CAPSULE ORAL at 21:55

## 2025-08-28 RX ADMIN — CARBAMAZEPINE 400 MG: 200 TABLET ORAL at 08:41

## 2025-08-28 RX ADMIN — BACLOFEN 10 MG: 10 TABLET ORAL at 06:05

## 2025-08-28 RX ADMIN — LEVETIRACETAM 1000 MG: 500 TABLET, FILM COATED ORAL at 21:54

## 2025-08-28 RX ADMIN — TAMSULOSIN HYDROCHLORIDE 0.8 MG: 0.4 CAPSULE ORAL at 08:41

## 2025-08-28 RX ADMIN — PANTOPRAZOLE SODIUM 40 MG: 40 TABLET, DELAYED RELEASE ORAL at 06:05

## 2025-08-28 RX ADMIN — FINASTERIDE 5 MG: 5 TABLET, FILM COATED ORAL at 08:41

## 2025-08-29 PROCEDURE — 6370000000 HC RX 637 (ALT 250 FOR IP): Performed by: INTERNAL MEDICINE

## 2025-08-29 PROCEDURE — 6560000002 HC HOSPICE GENERAL INPATIENT

## 2025-08-29 RX ADMIN — CARBAMAZEPINE 400 MG: 200 TABLET ORAL at 21:16

## 2025-08-29 RX ADMIN — LEVETIRACETAM 1000 MG: 500 TABLET, FILM COATED ORAL at 08:42

## 2025-08-29 RX ADMIN — LEVETIRACETAM 1000 MG: 500 TABLET, FILM COATED ORAL at 21:16

## 2025-08-29 RX ADMIN — LATANOPROST 1 DROP: 50 SOLUTION OPHTHALMIC at 21:16

## 2025-08-29 RX ADMIN — CARBAMAZEPINE 400 MG: 200 TABLET ORAL at 08:42

## 2025-08-29 RX ADMIN — NORTRIPTYLINE HYDROCHLORIDE 25 MG: 25 CAPSULE ORAL at 21:16

## 2025-08-29 RX ADMIN — BACLOFEN 10 MG: 10 TABLET ORAL at 21:17

## 2025-08-29 RX ADMIN — MELOXICAM 7.5 MG: 7.5 TABLET ORAL at 21:16

## 2025-08-29 RX ADMIN — MELOXICAM 7.5 MG: 7.5 TABLET ORAL at 08:42

## 2025-08-29 RX ADMIN — BACLOFEN 10 MG: 10 TABLET ORAL at 14:08

## 2025-08-29 RX ADMIN — TAMSULOSIN HYDROCHLORIDE 0.8 MG: 0.4 CAPSULE ORAL at 08:42

## 2025-08-29 RX ADMIN — OXYCODONE 5 MG: 5 TABLET ORAL at 11:34

## 2025-08-29 RX ADMIN — FINASTERIDE 5 MG: 5 TABLET, FILM COATED ORAL at 08:42

## 2025-08-29 RX ADMIN — VENLAFAXINE HYDROCHLORIDE 75 MG: 75 CAPSULE, EXTENDED RELEASE ORAL at 08:42

## 2025-08-29 ASSESSMENT — PAIN - FUNCTIONAL ASSESSMENT
PAIN_FUNCTIONAL_ASSESSMENT: ADULT NONVERBAL PAIN SCALE (NPVS)

## 2025-08-30 PROCEDURE — 6370000000 HC RX 637 (ALT 250 FOR IP): Performed by: INTERNAL MEDICINE

## 2025-08-30 PROCEDURE — 6560000002 HC HOSPICE GENERAL INPATIENT

## 2025-08-30 RX ADMIN — CARBAMAZEPINE 400 MG: 200 TABLET ORAL at 21:09

## 2025-08-30 RX ADMIN — PANTOPRAZOLE SODIUM 40 MG: 40 TABLET, DELAYED RELEASE ORAL at 05:54

## 2025-08-30 RX ADMIN — TAMSULOSIN HYDROCHLORIDE 0.8 MG: 0.4 CAPSULE ORAL at 08:24

## 2025-08-30 RX ADMIN — BACLOFEN 10 MG: 10 TABLET ORAL at 05:54

## 2025-08-30 RX ADMIN — FINASTERIDE 5 MG: 5 TABLET, FILM COATED ORAL at 08:24

## 2025-08-30 RX ADMIN — MELOXICAM 7.5 MG: 7.5 TABLET ORAL at 08:24

## 2025-08-30 RX ADMIN — LEVETIRACETAM 1000 MG: 500 TABLET, FILM COATED ORAL at 08:24

## 2025-08-30 RX ADMIN — OXYCODONE 5 MG: 5 TABLET ORAL at 10:26

## 2025-08-30 RX ADMIN — BACLOFEN 10 MG: 10 TABLET ORAL at 13:49

## 2025-08-30 RX ADMIN — NORTRIPTYLINE HYDROCHLORIDE 25 MG: 25 CAPSULE ORAL at 21:09

## 2025-08-30 RX ADMIN — BACLOFEN 10 MG: 10 TABLET ORAL at 21:08

## 2025-08-30 RX ADMIN — LATANOPROST 1 DROP: 50 SOLUTION OPHTHALMIC at 21:09

## 2025-08-30 RX ADMIN — LEVETIRACETAM 1000 MG: 500 TABLET, FILM COATED ORAL at 21:09

## 2025-08-30 RX ADMIN — CARBAMAZEPINE 400 MG: 200 TABLET ORAL at 08:24

## 2025-08-30 RX ADMIN — VENLAFAXINE HYDROCHLORIDE 75 MG: 75 CAPSULE, EXTENDED RELEASE ORAL at 08:24

## 2025-08-30 RX ADMIN — LORAZEPAM 0.5 MG: 0.5 TABLET ORAL at 10:27

## 2025-08-30 RX ADMIN — OXYCODONE 5 MG: 5 TABLET ORAL at 06:27

## 2025-08-30 RX ADMIN — MELOXICAM 7.5 MG: 7.5 TABLET ORAL at 21:09

## 2025-08-31 PROCEDURE — 6560000002 HC HOSPICE GENERAL INPATIENT

## 2025-08-31 PROCEDURE — 6370000000 HC RX 637 (ALT 250 FOR IP): Performed by: INTERNAL MEDICINE

## 2025-08-31 RX ADMIN — OXYCODONE 5 MG: 5 TABLET ORAL at 20:54

## 2025-08-31 RX ADMIN — MELOXICAM 7.5 MG: 7.5 TABLET ORAL at 20:31

## 2025-08-31 RX ADMIN — CARBAMAZEPINE 400 MG: 200 TABLET ORAL at 08:02

## 2025-08-31 RX ADMIN — BACLOFEN 10 MG: 10 TABLET ORAL at 06:02

## 2025-08-31 RX ADMIN — BACLOFEN 10 MG: 10 TABLET ORAL at 20:31

## 2025-08-31 RX ADMIN — LEVETIRACETAM 1000 MG: 500 TABLET, FILM COATED ORAL at 08:02

## 2025-08-31 RX ADMIN — LEVETIRACETAM 1000 MG: 500 TABLET, FILM COATED ORAL at 20:31

## 2025-08-31 RX ADMIN — CARBAMAZEPINE 400 MG: 200 TABLET ORAL at 20:31

## 2025-08-31 RX ADMIN — PANTOPRAZOLE SODIUM 40 MG: 40 TABLET, DELAYED RELEASE ORAL at 06:02

## 2025-08-31 RX ADMIN — VENLAFAXINE HYDROCHLORIDE 75 MG: 75 CAPSULE, EXTENDED RELEASE ORAL at 08:02

## 2025-08-31 RX ADMIN — LATANOPROST 1 DROP: 50 SOLUTION OPHTHALMIC at 20:31

## 2025-08-31 RX ADMIN — NORTRIPTYLINE HYDROCHLORIDE 25 MG: 25 CAPSULE ORAL at 20:31

## 2025-08-31 RX ADMIN — FINASTERIDE 5 MG: 5 TABLET, FILM COATED ORAL at 08:02

## 2025-08-31 RX ADMIN — MELOXICAM 7.5 MG: 7.5 TABLET ORAL at 08:02

## 2025-08-31 RX ADMIN — TAMSULOSIN HYDROCHLORIDE 0.8 MG: 0.4 CAPSULE ORAL at 08:02

## 2025-09-01 VITALS
SYSTOLIC BLOOD PRESSURE: 134 MMHG | OXYGEN SATURATION: 96 % | TEMPERATURE: 97.7 F | DIASTOLIC BLOOD PRESSURE: 69 MMHG | HEART RATE: 70 BPM | RESPIRATION RATE: 16 BRPM

## 2025-09-01 PROCEDURE — 6370000000 HC RX 637 (ALT 250 FOR IP): Performed by: INTERNAL MEDICINE

## 2025-09-01 RX ADMIN — LEVETIRACETAM 1000 MG: 500 TABLET, FILM COATED ORAL at 08:24

## 2025-09-01 RX ADMIN — VENLAFAXINE HYDROCHLORIDE 75 MG: 75 CAPSULE, EXTENDED RELEASE ORAL at 08:24

## 2025-09-01 RX ADMIN — TAMSULOSIN HYDROCHLORIDE 0.8 MG: 0.4 CAPSULE ORAL at 08:24

## 2025-09-01 RX ADMIN — BACLOFEN 10 MG: 10 TABLET ORAL at 05:24

## 2025-09-01 RX ADMIN — MELOXICAM 7.5 MG: 7.5 TABLET ORAL at 08:24

## 2025-09-01 RX ADMIN — OXYCODONE 5 MG: 5 TABLET ORAL at 08:35

## 2025-09-01 RX ADMIN — PANTOPRAZOLE SODIUM 40 MG: 40 TABLET, DELAYED RELEASE ORAL at 05:23

## 2025-09-01 RX ADMIN — FINASTERIDE 5 MG: 5 TABLET, FILM COATED ORAL at 08:25

## 2025-09-01 RX ADMIN — CARBAMAZEPINE 400 MG: 200 TABLET ORAL at 08:25

## 2025-09-01 ASSESSMENT — PAIN - FUNCTIONAL ASSESSMENT: PAIN_FUNCTIONAL_ASSESSMENT: FACE, LEGS, ACTIVITY, CRY, AND CONSOLABILITY (FLACC)
